# Patient Record
Sex: FEMALE | Race: BLACK OR AFRICAN AMERICAN | NOT HISPANIC OR LATINO | Employment: OTHER | URBAN - METROPOLITAN AREA
[De-identification: names, ages, dates, MRNs, and addresses within clinical notes are randomized per-mention and may not be internally consistent; named-entity substitution may affect disease eponyms.]

---

## 2021-04-21 ENCOUNTER — HOSPITAL ENCOUNTER (EMERGENCY)
Facility: HOSPITAL | Age: 51
Discharge: HOME | End: 2021-04-21
Attending: FAMILY MEDICINE
Payer: MEDICARE

## 2021-04-21 VITALS
WEIGHT: 190.06 LBS | OXYGEN SATURATION: 98 % | HEART RATE: 83 BPM | TEMPERATURE: 99 F | RESPIRATION RATE: 17 BRPM | DIASTOLIC BLOOD PRESSURE: 84 MMHG | SYSTOLIC BLOOD PRESSURE: 145 MMHG

## 2021-04-21 DIAGNOSIS — R42 DIZZINESS: ICD-10-CM

## 2021-04-21 DIAGNOSIS — R42 VERTIGO: Primary | ICD-10-CM

## 2021-04-21 LAB
ALBUMIN SERPL BCP-MCNC: 3.6 G/DL (ref 3.5–5.2)
ALP SERPL-CCNC: 75 U/L (ref 55–135)
ALT SERPL W/O P-5'-P-CCNC: 54 U/L (ref 10–44)
AMPHET+METHAMPHET UR QL: NEGATIVE
ANION GAP SERPL CALC-SCNC: 7 MMOL/L (ref 8–16)
AST SERPL-CCNC: 47 U/L (ref 10–40)
BACTERIA #/AREA URNS HPF: ABNORMAL /HPF
BARBITURATES UR QL SCN>200 NG/ML: NEGATIVE
BASOPHILS # BLD AUTO: 0.01 K/UL (ref 0–0.2)
BASOPHILS NFR BLD: 0.2 % (ref 0–1.9)
BENZODIAZ UR QL SCN>200 NG/ML: NEGATIVE
BILIRUB SERPL-MCNC: 0.3 MG/DL (ref 0.1–1)
BILIRUB UR QL STRIP: NEGATIVE
BNP SERPL-MCNC: 27 PG/ML (ref 0–99)
BUN SERPL-MCNC: 11 MG/DL (ref 6–20)
BZE UR QL SCN: NEGATIVE
CALCIUM SERPL-MCNC: 9.4 MG/DL (ref 8.7–10.5)
CANNABINOIDS UR QL SCN: NEGATIVE
CHLORIDE SERPL-SCNC: 108 MMOL/L (ref 95–110)
CLARITY UR: CLEAR
CO2 SERPL-SCNC: 25 MMOL/L (ref 23–29)
COLOR UR: YELLOW
CREAT SERPL-MCNC: 0.8 MG/DL (ref 0.5–1.4)
CREAT UR-MCNC: 26.9 MG/DL (ref 15–325)
DIFFERENTIAL METHOD: ABNORMAL
EOSINOPHIL # BLD AUTO: 0.1 K/UL (ref 0–0.5)
EOSINOPHIL NFR BLD: 2.1 % (ref 0–8)
ERYTHROCYTE [DISTWIDTH] IN BLOOD BY AUTOMATED COUNT: 12.8 % (ref 11.5–14.5)
EST. GFR  (AFRICAN AMERICAN): >60 ML/MIN/1.73 M^2
EST. GFR  (NON AFRICAN AMERICAN): >60 ML/MIN/1.73 M^2
GLUCOSE SERPL-MCNC: 92 MG/DL (ref 70–110)
GLUCOSE UR QL STRIP: NEGATIVE
HCT VFR BLD AUTO: 41 % (ref 37–48.5)
HGB BLD-MCNC: 13.9 G/DL (ref 12–16)
HGB UR QL STRIP: ABNORMAL
IMM GRANULOCYTES # BLD AUTO: 0 K/UL (ref 0–0.04)
IMM GRANULOCYTES NFR BLD AUTO: 0 % (ref 0–0.5)
KETONES UR QL STRIP: NEGATIVE
LEUKOCYTE ESTERASE UR QL STRIP: ABNORMAL
LYMPHOCYTES # BLD AUTO: 2.5 K/UL (ref 1–4.8)
LYMPHOCYTES NFR BLD: 56.2 % (ref 18–48)
MCH RBC QN AUTO: 32 PG (ref 27–31)
MCHC RBC AUTO-ENTMCNC: 33.9 G/DL (ref 32–36)
MCV RBC AUTO: 95 FL (ref 82–98)
METHADONE UR QL SCN>300 NG/ML: NEGATIVE
MICROSCOPIC COMMENT: ABNORMAL
MONOCYTES # BLD AUTO: 0.3 K/UL (ref 0.3–1)
MONOCYTES NFR BLD: 6.9 % (ref 4–15)
NEUTROPHILS # BLD AUTO: 1.5 K/UL (ref 1.8–7.7)
NEUTROPHILS NFR BLD: 34.6 % (ref 38–73)
NITRITE UR QL STRIP: NEGATIVE
NRBC BLD-RTO: 0 /100 WBC
OPIATES UR QL SCN: NEGATIVE
PCP UR QL SCN>25 NG/ML: NEGATIVE
PH UR STRIP: 6 [PH] (ref 5–8)
PLATELET # BLD AUTO: 215 K/UL (ref 150–450)
PLATELET BLD QL SMEAR: ABNORMAL
PMV BLD AUTO: 10.2 FL (ref 9.2–12.9)
POCT GLUCOSE: 98 MG/DL (ref 70–110)
POTASSIUM SERPL-SCNC: 3.9 MMOL/L (ref 3.5–5.1)
PROT SERPL-MCNC: 8.8 G/DL (ref 6–8.4)
PROT UR QL STRIP: NEGATIVE
RBC # BLD AUTO: 4.34 M/UL (ref 4–5.4)
RBC #/AREA URNS HPF: 3 /HPF (ref 0–4)
SODIUM SERPL-SCNC: 140 MMOL/L (ref 136–145)
SP GR UR STRIP: 1.01 (ref 1–1.03)
TOXICOLOGY INFORMATION: NORMAL
TROPONIN I SERPL DL<=0.01 NG/ML-MCNC: 0.01 NG/ML (ref 0–0.03)
URN SPEC COLLECT METH UR: ABNORMAL
UROBILINOGEN UR STRIP-ACNC: NEGATIVE EU/DL
WBC # BLD AUTO: 4.36 K/UL (ref 3.9–12.7)
WBC #/AREA URNS HPF: 7 /HPF (ref 0–5)

## 2021-04-21 PROCEDURE — 93005 ELECTROCARDIOGRAM TRACING: CPT

## 2021-04-21 PROCEDURE — 80053 COMPREHEN METABOLIC PANEL: CPT | Performed by: FAMILY MEDICINE

## 2021-04-21 PROCEDURE — 85025 COMPLETE CBC W/AUTO DIFF WBC: CPT | Performed by: FAMILY MEDICINE

## 2021-04-21 PROCEDURE — 80307 DRUG TEST PRSMV CHEM ANLYZR: CPT | Performed by: FAMILY MEDICINE

## 2021-04-21 PROCEDURE — 81000 URINALYSIS NONAUTO W/SCOPE: CPT | Mod: 59 | Performed by: FAMILY MEDICINE

## 2021-04-21 PROCEDURE — 99284 EMERGENCY DEPT VISIT MOD MDM: CPT | Mod: 25

## 2021-04-21 PROCEDURE — 84484 ASSAY OF TROPONIN QUANT: CPT | Performed by: FAMILY MEDICINE

## 2021-04-21 PROCEDURE — 25000003 PHARM REV CODE 250: Performed by: FAMILY MEDICINE

## 2021-04-21 PROCEDURE — 82962 GLUCOSE BLOOD TEST: CPT

## 2021-04-21 PROCEDURE — 93010 EKG 12-LEAD: ICD-10-PCS | Mod: ,,, | Performed by: INTERNAL MEDICINE

## 2021-04-21 PROCEDURE — 93010 ELECTROCARDIOGRAM REPORT: CPT | Mod: ,,, | Performed by: INTERNAL MEDICINE

## 2021-04-21 PROCEDURE — 83880 ASSAY OF NATRIURETIC PEPTIDE: CPT | Performed by: FAMILY MEDICINE

## 2021-04-21 RX ORDER — LISINOPRIL AND HYDROCHLOROTHIAZIDE 20; 25 MG/1; MG/1
1 TABLET ORAL DAILY
COMMUNITY

## 2021-04-21 RX ORDER — MECLIZINE HYDROCHLORIDE 25 MG/1
25 TABLET ORAL
Status: COMPLETED | OUTPATIENT
Start: 2021-04-21 | End: 2021-04-21

## 2021-04-21 RX ORDER — MECLIZINE HYDROCHLORIDE 25 MG/1
25 TABLET ORAL 3 TIMES DAILY PRN
Qty: 20 TABLET | Refills: 0 | Status: SHIPPED | OUTPATIENT
Start: 2021-04-21

## 2021-04-21 RX ADMIN — MECLIZINE HYDROCHLORIDE 25 MG: 25 TABLET ORAL at 07:04

## 2022-08-02 PROCEDURE — 99285 EMERGENCY DEPT VISIT HI MDM: CPT | Mod: 25

## 2022-08-03 ENCOUNTER — HOSPITAL ENCOUNTER (INPATIENT)
Facility: HOSPITAL | Age: 52
LOS: 2 days | Discharge: HOME | DRG: 824 | End: 2022-08-05
Attending: EMERGENCY MEDICINE | Admitting: INTERNAL MEDICINE
Payer: MEDICARE

## 2022-08-03 ENCOUNTER — TELEPHONE (OUTPATIENT)
Dept: PREADMISSION TESTING | Facility: HOSPITAL | Age: 52
End: 2022-08-03
Payer: MEDICARE

## 2022-08-03 DIAGNOSIS — B20 HIV DISEASE: ICD-10-CM

## 2022-08-03 DIAGNOSIS — L04.9 NECROTIZING INFLAMMATION OF LYMPH NODE: ICD-10-CM

## 2022-08-03 DIAGNOSIS — L03.221 CELLULITIS OF NECK: ICD-10-CM

## 2022-08-03 DIAGNOSIS — R22.1 MASS OF LEFT SIDE OF NECK: Primary | ICD-10-CM

## 2022-08-03 PROBLEM — I10 PRIMARY HYPERTENSION: Status: ACTIVE | Noted: 2022-08-03

## 2022-08-03 LAB
ALBUMIN SERPL BCP-MCNC: 3.5 G/DL (ref 3.5–5.2)
ALP SERPL-CCNC: 86 U/L (ref 55–135)
ALT SERPL W/O P-5'-P-CCNC: 32 U/L (ref 10–44)
ANION GAP SERPL CALC-SCNC: 9 MMOL/L (ref 8–16)
ANISOCYTOSIS BLD QL SMEAR: SLIGHT
AST SERPL-CCNC: 37 U/L (ref 10–40)
BASOPHILS # BLD AUTO: 0.01 K/UL (ref 0–0.2)
BASOPHILS NFR BLD: 0.2 % (ref 0–1.9)
BILIRUB SERPL-MCNC: 0.3 MG/DL (ref 0.1–1)
BUN SERPL-MCNC: 11 MG/DL (ref 6–20)
CALCIUM SERPL-MCNC: 9.9 MG/DL (ref 8.7–10.5)
CHLORIDE SERPL-SCNC: 104 MMOL/L (ref 95–110)
CO2 SERPL-SCNC: 24 MMOL/L (ref 23–29)
CREAT SERPL-MCNC: 0.8 MG/DL (ref 0.5–1.4)
DIFFERENTIAL METHOD: ABNORMAL
EOSINOPHIL # BLD AUTO: 0.1 K/UL (ref 0–0.5)
EOSINOPHIL NFR BLD: 1.6 % (ref 0–8)
ERYTHROCYTE [DISTWIDTH] IN BLOOD BY AUTOMATED COUNT: 13.2 % (ref 11.5–14.5)
EST. GFR  (NO RACE VARIABLE): >60 ML/MIN/1.73 M^2
GLUCOSE SERPL-MCNC: 111 MG/DL (ref 70–110)
HCT VFR BLD AUTO: 33.9 % (ref 37–48.5)
HGB BLD-MCNC: 11.6 G/DL (ref 12–16)
IMM GRANULOCYTES # BLD AUTO: 0.02 K/UL (ref 0–0.04)
IMM GRANULOCYTES NFR BLD AUTO: 0.3 % (ref 0–0.5)
LDH SERPL L TO P-CCNC: 483 U/L (ref 110–260)
LYMPHOCYTES # BLD AUTO: 2.9 K/UL (ref 1–4.8)
LYMPHOCYTES NFR BLD: 45.8 % (ref 18–48)
MCH RBC QN AUTO: 31.8 PG (ref 27–31)
MCHC RBC AUTO-ENTMCNC: 34.2 G/DL (ref 32–36)
MCV RBC AUTO: 93 FL (ref 82–98)
MONOCYTES # BLD AUTO: 0.5 K/UL (ref 0.3–1)
MONOCYTES NFR BLD: 7.2 % (ref 4–15)
NEUTROPHILS # BLD AUTO: 2.8 K/UL (ref 1.8–7.7)
NEUTROPHILS NFR BLD: 44.9 % (ref 38–73)
NRBC BLD-RTO: 0 /100 WBC
OVALOCYTES BLD QL SMEAR: ABNORMAL
PLATELET # BLD AUTO: 234 K/UL (ref 150–450)
PLATELET BLD QL SMEAR: ABNORMAL
PMV BLD AUTO: 9.4 FL (ref 9.2–12.9)
POTASSIUM SERPL-SCNC: 4.2 MMOL/L (ref 3.5–5.1)
PROT SERPL-MCNC: 8.2 G/DL (ref 6–8.4)
RBC # BLD AUTO: 3.65 M/UL (ref 4–5.4)
SARS-COV-2 RDRP RESP QL NAA+PROBE: NEGATIVE
SODIUM SERPL-SCNC: 137 MMOL/L (ref 136–145)
T3 SERPL-MCNC: 156 NG/DL (ref 60–180)
T4 FREE SERPL-MCNC: 1.08 NG/DL (ref 0.71–1.51)
TSH SERPL DL<=0.005 MIU/L-ACNC: 0.57 UIU/ML (ref 0.4–4)
WBC # BLD AUTO: 6.24 K/UL (ref 3.9–12.7)

## 2022-08-03 PROCEDURE — 99223 PR INITIAL HOSPITAL CARE,LEVL III: ICD-10-PCS | Mod: ,,, | Performed by: INTERNAL MEDICINE

## 2022-08-03 PROCEDURE — 87536 HIV-1 QUANT&REVRSE TRNSCRPJ: CPT | Performed by: INTERNAL MEDICINE

## 2022-08-03 PROCEDURE — 84439 ASSAY OF FREE THYROXINE: CPT | Performed by: NURSE PRACTITIONER

## 2022-08-03 PROCEDURE — 63600175 PHARM REV CODE 636 W HCPCS: Performed by: INTERNAL MEDICINE

## 2022-08-03 PROCEDURE — 25000003 PHARM REV CODE 250: Performed by: EMERGENCY MEDICINE

## 2022-08-03 PROCEDURE — 36415 COLL VENOUS BLD VENIPUNCTURE: CPT | Performed by: NURSE PRACTITIONER

## 2022-08-03 PROCEDURE — 84480 ASSAY TRIIODOTHYRONINE (T3): CPT | Performed by: NURSE PRACTITIONER

## 2022-08-03 PROCEDURE — 25000003 PHARM REV CODE 250: Performed by: NURSE PRACTITIONER

## 2022-08-03 PROCEDURE — 87040 BLOOD CULTURE FOR BACTERIA: CPT | Performed by: INTERNAL MEDICINE

## 2022-08-03 PROCEDURE — 99223 1ST HOSP IP/OBS HIGH 75: CPT | Mod: ,,, | Performed by: INTERNAL MEDICINE

## 2022-08-03 PROCEDURE — 80053 COMPREHEN METABOLIC PANEL: CPT | Performed by: NURSE PRACTITIONER

## 2022-08-03 PROCEDURE — 85025 COMPLETE CBC W/AUTO DIFF WBC: CPT | Performed by: NURSE PRACTITIONER

## 2022-08-03 PROCEDURE — 25000003 PHARM REV CODE 250: Performed by: INTERNAL MEDICINE

## 2022-08-03 PROCEDURE — 36415 COLL VENOUS BLD VENIPUNCTURE: CPT | Performed by: INTERNAL MEDICINE

## 2022-08-03 PROCEDURE — 83615 LACTATE (LD) (LDH) ENZYME: CPT | Performed by: NURSE PRACTITIONER

## 2022-08-03 PROCEDURE — 96365 THER/PROPH/DIAG IV INF INIT: CPT

## 2022-08-03 PROCEDURE — 96361 HYDRATE IV INFUSION ADD-ON: CPT

## 2022-08-03 PROCEDURE — 86361 T CELL ABSOLUTE COUNT: CPT | Performed by: INTERNAL MEDICINE

## 2022-08-03 PROCEDURE — 94761 N-INVAS EAR/PLS OXIMETRY MLT: CPT

## 2022-08-03 PROCEDURE — 96375 TX/PRO/DX INJ NEW DRUG ADDON: CPT

## 2022-08-03 PROCEDURE — 21400001 HC TELEMETRY ROOM

## 2022-08-03 PROCEDURE — U0002 COVID-19 LAB TEST NON-CDC: HCPCS | Performed by: EMERGENCY MEDICINE

## 2022-08-03 PROCEDURE — 25500020 PHARM REV CODE 255: Performed by: EMERGENCY MEDICINE

## 2022-08-03 PROCEDURE — 63600175 PHARM REV CODE 636 W HCPCS: Performed by: EMERGENCY MEDICINE

## 2022-08-03 PROCEDURE — 84443 ASSAY THYROID STIM HORMONE: CPT | Performed by: NURSE PRACTITIONER

## 2022-08-03 RX ORDER — HYDROCODONE BITARTRATE AND ACETAMINOPHEN 5; 325 MG/1; MG/1
1 TABLET ORAL EVERY 6 HOURS PRN
Status: DISCONTINUED | OUTPATIENT
Start: 2022-08-03 | End: 2022-08-05 | Stop reason: HOSPADM

## 2022-08-03 RX ORDER — ACETAMINOPHEN 325 MG/1
650 TABLET ORAL EVERY 6 HOURS PRN
Status: DISCONTINUED | OUTPATIENT
Start: 2022-08-03 | End: 2022-08-05 | Stop reason: HOSPADM

## 2022-08-03 RX ORDER — BISACODYL 10 MG
10 SUPPOSITORY, RECTAL RECTAL DAILY PRN
Status: DISCONTINUED | OUTPATIENT
Start: 2022-08-03 | End: 2022-08-05 | Stop reason: HOSPADM

## 2022-08-03 RX ORDER — ONDANSETRON 2 MG/ML
4 INJECTION INTRAMUSCULAR; INTRAVENOUS EVERY 8 HOURS PRN
Status: DISCONTINUED | OUTPATIENT
Start: 2022-08-03 | End: 2022-08-05 | Stop reason: HOSPADM

## 2022-08-03 RX ORDER — SODIUM CHLORIDE 0.9 % (FLUSH) 0.9 %
10 SYRINGE (ML) INJECTION EVERY 12 HOURS PRN
Status: DISCONTINUED | OUTPATIENT
Start: 2022-08-03 | End: 2022-08-05 | Stop reason: HOSPADM

## 2022-08-03 RX ORDER — LISINOPRIL 20 MG/1
20 TABLET ORAL DAILY
Status: DISCONTINUED | OUTPATIENT
Start: 2022-08-03 | End: 2022-08-05 | Stop reason: HOSPADM

## 2022-08-03 RX ORDER — MECLIZINE HYDROCHLORIDE 25 MG/1
25 TABLET ORAL 3 TIMES DAILY PRN
Status: DISCONTINUED | OUTPATIENT
Start: 2022-08-03 | End: 2022-08-05 | Stop reason: HOSPADM

## 2022-08-03 RX ORDER — HYDROCODONE BITARTRATE AND ACETAMINOPHEN 10; 325 MG/1; MG/1
1 TABLET ORAL EVERY 6 HOURS PRN
Status: DISCONTINUED | OUTPATIENT
Start: 2022-08-03 | End: 2022-08-05 | Stop reason: HOSPADM

## 2022-08-03 RX ORDER — ENOXAPARIN SODIUM 100 MG/ML
40 INJECTION SUBCUTANEOUS EVERY 24 HOURS
Status: DISCONTINUED | OUTPATIENT
Start: 2022-08-03 | End: 2022-08-03

## 2022-08-03 RX ORDER — NALOXONE HCL 0.4 MG/ML
0.02 VIAL (ML) INJECTION
Status: DISCONTINUED | OUTPATIENT
Start: 2022-08-03 | End: 2022-08-05 | Stop reason: HOSPADM

## 2022-08-03 RX ORDER — KETOROLAC TROMETHAMINE 30 MG/ML
15 INJECTION, SOLUTION INTRAMUSCULAR; INTRAVENOUS
Status: COMPLETED | OUTPATIENT
Start: 2022-08-03 | End: 2022-08-03

## 2022-08-03 RX ORDER — HYDROCODONE BITARTRATE AND ACETAMINOPHEN 5; 325 MG/1; MG/1
1 TABLET ORAL EVERY 4 HOURS PRN
Status: DISCONTINUED | OUTPATIENT
Start: 2022-08-03 | End: 2022-08-03

## 2022-08-03 RX ORDER — LISINOPRIL AND HYDROCHLOROTHIAZIDE 20; 25 MG/1; MG/1
1 TABLET ORAL DAILY
Status: DISCONTINUED | OUTPATIENT
Start: 2022-08-03 | End: 2022-08-03 | Stop reason: CLARIF

## 2022-08-03 RX ORDER — TALC
6 POWDER (GRAM) TOPICAL NIGHTLY PRN
Status: DISCONTINUED | OUTPATIENT
Start: 2022-08-03 | End: 2022-08-03

## 2022-08-03 RX ORDER — AMOXICILLIN 250 MG
1 CAPSULE ORAL DAILY PRN
Status: DISCONTINUED | OUTPATIENT
Start: 2022-08-03 | End: 2022-08-05 | Stop reason: HOSPADM

## 2022-08-03 RX ORDER — HYDROCHLOROTHIAZIDE 25 MG/1
25 TABLET ORAL DAILY
Status: DISCONTINUED | OUTPATIENT
Start: 2022-08-03 | End: 2022-08-05 | Stop reason: HOSPADM

## 2022-08-03 RX ORDER — ACETAMINOPHEN 325 MG/1
650 TABLET ORAL EVERY 4 HOURS PRN
Status: DISCONTINUED | OUTPATIENT
Start: 2022-08-03 | End: 2022-08-03

## 2022-08-03 RX ORDER — TALC
6 POWDER (GRAM) TOPICAL NIGHTLY PRN
Status: DISCONTINUED | OUTPATIENT
Start: 2022-08-03 | End: 2022-08-05 | Stop reason: HOSPADM

## 2022-08-03 RX ORDER — DOXYCYCLINE HYCLATE 100 MG
100 TABLET ORAL EVERY 12 HOURS
Status: DISCONTINUED | OUTPATIENT
Start: 2022-08-03 | End: 2022-08-05

## 2022-08-03 RX ORDER — MAG HYDROX/ALUMINUM HYD/SIMETH 200-200-20
30 SUSPENSION, ORAL (FINAL DOSE FORM) ORAL 4 TIMES DAILY PRN
Status: DISCONTINUED | OUTPATIENT
Start: 2022-08-03 | End: 2022-08-05 | Stop reason: HOSPADM

## 2022-08-03 RX ORDER — SODIUM CHLORIDE 9 MG/ML
INJECTION, SOLUTION INTRAVENOUS CONTINUOUS
Status: ACTIVE | OUTPATIENT
Start: 2022-08-03 | End: 2022-08-04

## 2022-08-03 RX ORDER — SODIUM CHLORIDE 0.9 % (FLUSH) 0.9 %
10 SYRINGE (ML) INJECTION
Status: DISCONTINUED | OUTPATIENT
Start: 2022-08-03 | End: 2022-08-05 | Stop reason: HOSPADM

## 2022-08-03 RX ORDER — ONDANSETRON 2 MG/ML
4 INJECTION INTRAMUSCULAR; INTRAVENOUS EVERY 8 HOURS PRN
Status: DISCONTINUED | OUTPATIENT
Start: 2022-08-03 | End: 2022-08-03

## 2022-08-03 RX ORDER — PROMETHAZINE HYDROCHLORIDE 25 MG/1
25 TABLET ORAL EVERY 6 HOURS PRN
Status: DISCONTINUED | OUTPATIENT
Start: 2022-08-03 | End: 2022-08-05 | Stop reason: HOSPADM

## 2022-08-03 RX ADMIN — IOHEXOL 100 ML: 350 INJECTION, SOLUTION INTRAVENOUS at 02:08

## 2022-08-03 RX ADMIN — CEFTRIAXONE 1 G: 1 INJECTION, SOLUTION INTRAVENOUS at 11:08

## 2022-08-03 RX ADMIN — SODIUM CHLORIDE: 0.9 INJECTION, SOLUTION INTRAVENOUS at 07:08

## 2022-08-03 RX ADMIN — LISINOPRIL 20 MG: 20 TABLET ORAL at 09:08

## 2022-08-03 RX ADMIN — KETOROLAC TROMETHAMINE 15 MG: 30 INJECTION, SOLUTION INTRAMUSCULAR; INTRAVENOUS at 02:08

## 2022-08-03 RX ADMIN — SODIUM CHLORIDE: 0.9 INJECTION, SOLUTION INTRAVENOUS at 03:08

## 2022-08-03 RX ADMIN — HYDROCODONE BITARTRATE AND ACETAMINOPHEN 1 TABLET: 10; 325 TABLET ORAL at 09:08

## 2022-08-03 RX ADMIN — ELVITEGRAVIR, COBICISTAT, EMTRICITABINE, AND TENOFOVIR ALAFENAMIDE 1 TABLET: 150; 150; 200; 10 TABLET ORAL at 03:08

## 2022-08-03 RX ADMIN — HYDROCHLOROTHIAZIDE 25 MG: 25 TABLET ORAL at 09:08

## 2022-08-03 RX ADMIN — DOXYCYCLINE HYCLATE 100 MG: 100 TABLET, COATED ORAL at 09:08

## 2022-08-03 RX ADMIN — DOXYCYCLINE HYCLATE 100 MG: 100 TABLET, COATED ORAL at 11:08

## 2022-08-03 RX ADMIN — SODIUM CHLORIDE 1000 ML: 0.9 INJECTION, SOLUTION INTRAVENOUS at 12:08

## 2022-08-03 NOTE — ED NOTES
Patient identifiers verified and correct for Ama Sol.    Left neck swelling and pain     LOC: The patient is awake, alert and aware of environment with an appropriate affect, the patient is oriented x 3 and speaking appropriately.    APPEARANCE: Patient resting comfortably and in no acute distress, patient is clean and well groomed, patient's clothing is properly fastened.    SKIN: The skin is warm and dry, color consistent with ethnicity, patient has normal skin turgor and moist mucus membranes, skin intact, no breakdown or bruising noted.     MUSCULOSKELETAL: Patient moving all extremities spontaneously.    RESPIRATORY: Airway is open and patent, respirations are spontaneous.    CARDIAC: Patient has a normal rate, no periphreal edema noted, capillary refill < 3 seconds.    ABDOMEN: Soft and non tender to palpation.

## 2022-08-03 NOTE — PLAN OF CARE
Received to room 217 from ER. Swelling to left neck. Denies c/o pain at this time. Regular diet, NPO after midnight for procedure tomorrow. Oriented to room,. Call bell in reach. No s/s acute distress. Will monitor.

## 2022-08-03 NOTE — TELEPHONE ENCOUNTER
----- Message from Cristina Neri LPN sent at 8/3/2022  1:37 PM CDT -----  Regarding: RE: surgery tomorrow  This patient is going to be admitted today. No need for pre op to contact. Dr Garcia is aware.   ----- Message -----  From: Shweta Granda RN  Sent: 8/3/2022   1:21 PM CDT  To: [facility]  Subject: surgery tomorrow                                 Good afternoon, will this surgery patient for tomorrow be a Hospital Admit or discharged today? Our department does not call them if they are admitted. Thanks.    -Pre Admit Testing dept.

## 2022-08-03 NOTE — ED PROVIDER NOTES
SCRIBE #1 NOTE: I, Mino Ho, am scribing for, and in the presence of, Dar Portillo MD. I have scribed the entire note.       History     Chief Complaint   Patient presents with    Neck Pain     Increased left sided neck pain and swelling x2 weeks.pt reports sore throat     Review of patient's allergies indicates:  No Known Allergies      History of Present Illness     HPI    8/3/2022, 3:22 AM  History obtained from the patient      History of Present Illness: Ama Sol is a 52 y.o. female patient who presents to the Emergency Department for evaluation of L neck pain which onset gradually 3 weeks ago. Pt states she had a bulge in her L ear that she scratched and resolved. She reports that's when she began to have her L neck pain along with bumps in her neck and shoulder area. Symptoms are constant and moderate in severity. No mitigating or exacerbating factors reported. Associated sxs include diaphoresis and subjective fever. Pt notes she is unsure if the diaphoresis is related to her menopause. Patient denies any HA, weakness, CP, n/v/d, chills, fatigue, SOB, abdominal pain, and all other sxs at this time. No prior Tx reported. No further complaints or concerns at this time.       Arrival mode: Personal vehicle    PCP: Meka Andrews        Past Medical History:  History reviewed. No pertinent past medical history.    Past Surgical History:  History reviewed. No pertinent surgical history.      Family History:  History reviewed. No pertinent family history.    Social History:  Social History     Tobacco Use    Smoking status: Current Every Day Smoker     Packs/day: 0.50     Types: Cigarettes    Smokeless tobacco: Never Used   Substance and Sexual Activity    Alcohol use: Not Currently    Drug use: Not Currently    Sexual activity: Not on file        Review of Systems     Review of Systems   Constitutional: Positive for diaphoresis and fever (Subjective).   HENT: Negative for sore  throat.    Respiratory: Negative for shortness of breath.    Cardiovascular: Negative for chest pain.   Gastrointestinal: Negative for nausea.   Genitourinary: Negative for dysuria.   Musculoskeletal: Positive for neck pain (L). Negative for back pain.   Skin: Negative for rash.   Neurological: Negative for weakness.   Hematological: Does not bruise/bleed easily.   All other systems reviewed and are negative.       Physical Exam     Initial Vitals [08/02/22 2157]   BP Pulse Resp Temp SpO2   (!) 188/105 (!) 134 18 99.8 °F (37.7 °C) 100 %      MAP       --          Physical Exam   Nursing Notes and Vital Signs Reviewed.  Constitutional: Patient is in no acute distress. Well-developed and well-nourished.  Head: Atraumatic. Normocephalic.  Eyes: PERRL. EOM intact. Conjunctivae are not pale. No scleral icterus.  ENT: Mucous membranes are moist.   Neck: Supple. Full ROM. Supraclavicular and left anterior cervical chain. Tender and significantly enlarged lymphnodes with overlying erythema and bulging.  Cardiovascular: Regular rate. Regular rhythm. No murmurs, rubs, or gallops. Distal pulses are 2+ and symmetric.  Pulmonary/Chest: No respiratory distress. Clear to auscultation bilaterally. No wheezing or rales.  Abdominal: Soft and non-distended.  There is no tenderness.  No rebound, guarding, or rigidity. Good bowel sounds.  Musculoskeletal: Moves all extremities. No obvious deformities. No edema.  Skin: Warm and dry.  Neurological:  Alert, awake, and appropriate.  Normal speech.  No acute focal neurological deficits are appreciated.  Psychiatric: Normal affect. Good eye contact. Appropriate in content.     ED Course   Procedures  ED Vital Signs:  Vitals:    08/04/22 1500 08/04/22 1505 08/04/22 1515 08/04/22 1530   BP: 121/84 (!) 142/79 (!) 147/76 (!) 146/73   Pulse: (!) 123 (!) 121 97 (!) 114   Resp: (!) 21 18 18 14   Temp:       TempSrc:       SpO2: 96% 97% 96% (!) 90%   Weight:       Height:        08/04/22 1545  08/04/22 1550 08/04/22 1600 08/04/22 1621   BP: (!) 150/74  113/68 133/65   Pulse: 96   101   Resp: 16 14  16   Temp:   99.7 °F (37.6 °C) 96.9 °F (36.1 °C)   TempSrc:   Temporal Oral   SpO2: 100%   100%   Weight:       Height:        08/04/22 2027 08/04/22 2029 08/04/22 2357 08/05/22 0407   BP:  99/71 113/77 (!) 159/90   Pulse:  94 98 109   Resp: 16 18 20 20   Temp:  99 °F (37.2 °C) 98.6 °F (37 °C) 98.6 °F (37 °C)   TempSrc:  Oral Oral Oral   SpO2:  98% 97% 98%   Weight:       Height:        08/05/22 0640 08/05/22 0758 08/05/22 0842   BP:  134/80    Pulse:  (!) 119    Resp: 18 17 16   Temp:  99.8 °F (37.7 °C)    TempSrc:  Oral    SpO2:  98%    Weight:      Height:          Abnormal Lab Results:  Labs Reviewed   CBC W/ AUTO DIFFERENTIAL - Abnormal; Notable for the following components:       Result Value    RBC 3.65 (*)     Hemoglobin 11.6 (*)     Hematocrit 33.9 (*)     MCH 31.8 (*)     All other components within normal limits   COMPREHENSIVE METABOLIC PANEL - Abnormal; Notable for the following components:    Glucose 111 (*)     All other components within normal limits   LACTATE DEHYDROGENASE - Abnormal; Notable for the following components:     (*)     All other components within normal limits   TSH   T3   T4, FREE   LACTATE DEHYDROGENASE   SARS-COV-2 RNA AMPLIFICATION, QUAL        All Lab Results:  Results for orders placed or performed during the hospital encounter of 08/03/22   Blood culture    Specimen: Peripheral, Antecubital, Left; Blood   Result Value Ref Range    Blood Culture, Routine No Growth to date     Blood Culture, Routine No Growth to date    Blood culture    Specimen: Peripheral, Antecubital, Left; Blood   Result Value Ref Range    Blood Culture, Routine No Growth to date     Blood Culture, Routine No Growth to date    CBC auto differential   Result Value Ref Range    WBC 6.24 3.90 - 12.70 K/uL    RBC 3.65 (L) 4.00 - 5.40 M/uL    Hemoglobin 11.6 (L) 12.0 - 16.0 g/dL    Hematocrit 33.9 (L)  37.0 - 48.5 %    MCV 93 82 - 98 fL    MCH 31.8 (H) 27.0 - 31.0 pg    MCHC 34.2 32.0 - 36.0 g/dL    RDW 13.2 11.5 - 14.5 %    Platelets 234 150 - 450 K/uL    MPV 9.4 9.2 - 12.9 fL    Immature Granulocytes 0.3 0.0 - 0.5 %    Gran # (ANC) 2.8 1.8 - 7.7 K/uL    Immature Grans (Abs) 0.02 0.00 - 0.04 K/uL    Lymph # 2.9 1.0 - 4.8 K/uL    Mono # 0.5 0.3 - 1.0 K/uL    Eos # 0.1 0.0 - 0.5 K/uL    Baso # 0.01 0.00 - 0.20 K/uL    nRBC 0 0 /100 WBC    Gran % 44.9 38.0 - 73.0 %    Lymph % 45.8 18.0 - 48.0 %    Mono % 7.2 4.0 - 15.0 %    Eosinophil % 1.6 0.0 - 8.0 %    Basophil % 0.2 0.0 - 1.9 %    Platelet Estimate Appears normal     Aniso Slight     Ovalocytes Occasional     Differential Method Automated    Comprehensive metabolic panel   Result Value Ref Range    Sodium 137 136 - 145 mmol/L    Potassium 4.2 3.5 - 5.1 mmol/L    Chloride 104 95 - 110 mmol/L    CO2 24 23 - 29 mmol/L    Glucose 111 (H) 70 - 110 mg/dL    BUN 11 6 - 20 mg/dL    Creatinine 0.8 0.5 - 1.4 mg/dL    Calcium 9.9 8.7 - 10.5 mg/dL    Total Protein 8.2 6.0 - 8.4 g/dL    Albumin 3.5 3.5 - 5.2 g/dL    Total Bilirubin 0.3 0.1 - 1.0 mg/dL    Alkaline Phosphatase 86 55 - 135 U/L    AST 37 10 - 40 U/L    ALT 32 10 - 44 U/L    Anion Gap 9 8 - 16 mmol/L    eGFR >60 >60 mL/min/1.73 m^2   TSH   Result Value Ref Range    TSH 0.574 0.400 - 4.000 uIU/mL   T3   Result Value Ref Range    T3, Total 156 60 - 180 ng/dL   T4, Free   Result Value Ref Range    Free T4 1.08 0.71 - 1.51 ng/dL   Lactate Dehydrogenase   Result Value Ref Range     (H) 110 - 260 U/L   COVID-19 Rapid Screening   Result Value Ref Range    SARS-CoV-2 RNA, Amplification, Qual Negative Negative   CD4 T-Tibbie Cells   Result Value Ref Range    CD4 % Tibbie T Cell 26.9 (L) 28 - 57 %    Absolute CD4 661 300 - 1400 cells/ul   CBC Auto Differential   Result Value Ref Range    WBC 5.10 3.90 - 12.70 K/uL    RBC 3.29 (L) 4.00 - 5.40 M/uL    Hemoglobin 10.3 (L) 12.0 - 16.0 g/dL    Hematocrit 30.8 (L) 37.0 -  48.5 %    MCV 94 82 - 98 fL    MCH 31.3 (H) 27.0 - 31.0 pg    MCHC 33.4 32.0 - 36.0 g/dL    RDW 13.3 11.5 - 14.5 %    Platelets 208 150 - 450 K/uL    MPV 9.3 9.2 - 12.9 fL    Immature Granulocytes 0.2 0.0 - 0.5 %    Gran # (ANC) 2.5 1.8 - 7.7 K/uL    Immature Grans (Abs) 0.01 0.00 - 0.04 K/uL    Lymph # 2.1 1.0 - 4.8 K/uL    Mono # 0.4 0.3 - 1.0 K/uL    Eos # 0.1 0.0 - 0.5 K/uL    Baso # 0.02 0.00 - 0.20 K/uL    nRBC 0 0 /100 WBC    Gran % 48.4 38.0 - 73.0 %    Lymph % 40.8 18.0 - 48.0 %    Mono % 7.8 4.0 - 15.0 %    Eosinophil % 2.4 0.0 - 8.0 %    Basophil % 0.4 0.0 - 1.9 %    Differential Method Automated    Comprehensive Metabolic Panel   Result Value Ref Range    Sodium 138 136 - 145 mmol/L    Potassium 4.5 3.5 - 5.1 mmol/L    Chloride 107 95 - 110 mmol/L    CO2 22 (L) 23 - 29 mmol/L    Glucose 95 70 - 110 mg/dL    BUN 12 6 - 20 mg/dL    Creatinine 0.7 0.5 - 1.4 mg/dL    Calcium 8.5 (L) 8.7 - 10.5 mg/dL    Total Protein 6.6 6.0 - 8.4 g/dL    Albumin 2.8 (L) 3.5 - 5.2 g/dL    Total Bilirubin 0.3 0.1 - 1.0 mg/dL    Alkaline Phosphatase 77 55 - 135 U/L    AST 27 10 - 40 U/L    ALT 21 10 - 44 U/L    Anion Gap 9 8 - 16 mmol/L    eGFR >60 >60 mL/min/1.73 m^2   Procalcitonin   Result Value Ref Range    Procalcitonin 0.04 <0.25 ng/mL   CBC Auto Differential   Result Value Ref Range    WBC 5.73 3.90 - 12.70 K/uL    RBC 3.30 (L) 4.00 - 5.40 M/uL    Hemoglobin 10.3 (L) 12.0 - 16.0 g/dL    Hematocrit 31.4 (L) 37.0 - 48.5 %    MCV 95 82 - 98 fL    MCH 31.2 (H) 27.0 - 31.0 pg    MCHC 32.8 32.0 - 36.0 g/dL    RDW 13.0 11.5 - 14.5 %    Platelets 207 150 - 450 K/uL    MPV 9.7 9.2 - 12.9 fL    Immature Granulocytes 0.3 0.0 - 0.5 %    Gran # (ANC) 3.0 1.8 - 7.7 K/uL    Immature Grans (Abs) 0.02 0.00 - 0.04 K/uL    Lymph # 2.1 1.0 - 4.8 K/uL    Mono # 0.5 0.3 - 1.0 K/uL    Eos # 0.1 0.0 - 0.5 K/uL    Baso # 0.02 0.00 - 0.20 K/uL    nRBC 0 0 /100 WBC    Gran % 52.7 38.0 - 73.0 %    Lymph % 36.6 18.0 - 48.0 %    Mono % 8.4 4.0 - 15.0  %    Eosinophil % 1.7 0.0 - 8.0 %    Basophil % 0.3 0.0 - 1.9 %    Differential Method Automated    Comprehensive Metabolic Panel   Result Value Ref Range    Sodium 137 136 - 145 mmol/L    Potassium 4.2 3.5 - 5.1 mmol/L    Chloride 105 95 - 110 mmol/L    CO2 23 23 - 29 mmol/L    Glucose 97 70 - 110 mg/dL    BUN 11 6 - 20 mg/dL    Creatinine 0.8 0.5 - 1.4 mg/dL    Calcium 8.6 (L) 8.7 - 10.5 mg/dL    Total Protein 6.7 6.0 - 8.4 g/dL    Albumin 2.7 (L) 3.5 - 5.2 g/dL    Total Bilirubin 0.3 0.1 - 1.0 mg/dL    Alkaline Phosphatase 77 55 - 135 U/L    AST 29 10 - 40 U/L    ALT 24 10 - 44 U/L    Anion Gap 9 8 - 16 mmol/L    eGFR >60 >60 mL/min/1.73 m^2   Magnesium   Result Value Ref Range    Magnesium 2.0 1.6 - 2.6 mg/dL   Phosphorus   Result Value Ref Range    Phosphorus 2.7 2.7 - 4.5 mg/dL         Imaging Results:  Imaging Results           CT Soft Tissue Neck With Contrast (Final result)  Result time 08/03/22 08:10:51    Final result by Abraham Correia MD (08/03/22 08:10:51)                 Impression:      Multifocal lymphadenopathy asymmetric towards the left most focal in the left supraclavicular and lower cervical regions favoring neoplastic disease with infectious etiology or superimposed infection not excluded.  Multiple necrotic lymph nodes with local inflammatory stranding and associated skin thickening and soft tissue fat stranding are noted in the supraclavicular region.    This report was flagged in Epic as abnormal.    The preliminary and final reports are concordant.      Electronically signed by: Abraham Correia  Date:    08/03/2022  Time:    08:10             Narrative:    EXAMINATION:  CT SOFT TISSUE NECK WITH CONTRAST    CLINICAL HISTORY:  Neck mass, nonpulsatile;    TECHNIQUE:  Axial CT images obtained throughout the region of the neck after the administration of intravenous contrast. Axial, sagittal and coronal reconstructions were performed.  Images acquired after the administration 100 mL Omnipaque  350 IV contrast.    COMPARISON:  No priors.    FINDINGS:  Left-sided predominant multifocal enlarged lymph nodes the largest non necrotic lymph nodes measure 2.0 cm in short axis as seen on series 2, image 76 and in the supraclavicular region 2.4 cm in short axis as seen on series 2, image 116.    Additionally there is bilateral axillary lymphadenopathy.    In the left lower cervical and supraclavicular region if there are numerous necrotic appearing lymph nodes with significant local inflammatory stranding possibly related to recent necrosis, although superinfection is difficult to exclude.  There is also adjacent skin thickening and soft tissue swelling.  This likely relates to the ongoing process within the lymph nodes and less likely superimposed trauma.    Otherwise the soft tissues of the nasopharynx, oropharynx, hypopharynx and larynx are within normal limits. The parotid, submandibular, and thyroid glands demonstrate nothing unusual.    Mild carotid bifurcation atherosclerosis bilaterally without significant stenosis.  The left internal jugular vein is significantly compressed by the lymphadenopathy but appears patent throughout its course, although mass effect deviates the course.    Limited intracranial evaluation is within normal limits.  The visualized paranasal sinuses and mastoid air cells are essentially clear.    Lung apices are clear.  There are minor osseous degenerative changes, but no lytic or blastic lesions are evident.                               RADIOLOGY REPORT (Final result)  Result time 08/03/22 17:36:06    Final result by Unknown User (08/03/22 17:36:06)                                 3:23 AM: Per Argenis Ansari MD from [facility], pt's CT Neck with IV Contrast results: Cluster of necrotic lymph nodes are seen in the left supraclavicular area measuring about 7-8 cm in greatest dimension. Multiple enlarged left-sided lymph nodes are also seen. Correlate with infectious process or  metastatic disease.      The Emergency Provider reviewed the vital signs and test results, which are outlined above.     ED Discussion     3:57 AM: Discussed pt's case with Dr. Van (Hem/Onc) who recommends admitting the patient for rapid lymphoma workup that includes biopsy in case of aggressive lymphoma.    5:44 AM: Discussed case with Ivelisse Grant NP ([facility]). Dr. Smith agrees with current care and management of pt and accepts admission.   Admitting Service: Hospital Medicine  Admitting Physician: Dr. Smith  Admit to: obs med surg    5:44 AM: Re-evaluated pt. I have discussed test results, shared treatment plan, and the need for admission with patient and family at bedside. Pt and family express understanding at this time and agree with all information. All questions answered. Pt and family have no further questions or concerns at this time. Pt is ready for admit.         Medical Decision Making:   Clinical Tests:   Lab Tests: Ordered and Reviewed  Radiological Study: Ordered and Reviewed           ED Medication(s):  Medications   meclizine tablet 25 mg (has no administration in time range)   sodium chloride 0.9% flush 10 mL (has no administration in time range)   naloxone 0.4 mg/mL injection 0.02 mg (has no administration in time range)   0.9%  NaCl infusion ( Intravenous Verify Only 8/4/22 1722)   acetaminophen tablet 650 mg (has no administration in time range)   HYDROcodone-acetaminophen 5-325 mg per tablet 1 tablet (1 tablet Oral Given 8/5/22 0640)   HYDROcodone-acetaminophen  mg per tablet 1 tablet (1 tablet Oral Given 8/5/22 0842)   bisacodyL suppository 10 mg (has no administration in time range)   promethazine tablet 25 mg (has no administration in time range)   aluminum-magnesium hydroxide-simethicone 200-200-20 mg/5 mL suspension 30 mL (has no administration in time range)   sodium chloride 0.9% flush 10 mL (has no administration in time range)   senna-docusate 8.6-50 mg per  tablet 1 tablet (has no administration in time range)   ondansetron injection 4 mg (has no administration in time range)   melatonin tablet 6 mg (has no administration in time range)   lisinopriL tablet 20 mg (20 mg Oral Given 8/5/22 0842)     And   hydroCHLOROthiazide tablet 25 mg (25 mg Oral Given 8/5/22 0842)   elviteg-cob-emtri-tenof ALAFEN [id number] mg Tab 1 tablet (1 tablet Oral Given 8/5/22 0842)   sodium chloride 0.9% flush 10 mL (has no administration in time range)   sodium chloride 0.9% flush 10 mL (has no administration in time range)   sodium chloride 0.9% bolus 1,000 mL (0 mLs Intravenous Stopped 8/3/22 0152)   iohexoL (OMNIPAQUE 350) injection 100 mL (100 mLs Intravenous Given 8/3/22 0222)   ketorolac injection 15 mg (15 mg Intravenous Given 8/3/22 0241)       Current Discharge Medication List                  Scribe Attestation:   Scribe #1: I performed the above scribed service and the documentation accurately describes the services I performed. I attest to the accuracy of the note.     Attending:   Physician Attestation Statement for Scribe #1: I, Dar Portillo MD, personally performed the services described in this documentation, as scribed by Mino Ho, in my presence, and it is both accurate and complete.           Clinical Impression       ICD-10-CM ICD-9-CM   1. Necrotizing inflammation of lymph node  L04.9 289.3   2. HIV disease  B20 042   3. Mass of left side of neck  R22.1 784.2       Disposition:   Disposition: Placed in Observation  Condition: Serious         Dar Portillo MD  08/05/22 0117

## 2022-08-03 NOTE — SUBJECTIVE & OBJECTIVE
No past medical history on file.    No past surgical history on file.    Review of patient's allergies indicates:  No Known Allergies    No current facility-administered medications on file prior to encounter.     Current Outpatient Medications on File Prior to Encounter   Medication Sig    lisinopriL-hydrochlorothiazide (PRINZIDE,ZESTORETIC) 20-25 mg Tab Take 1 tablet by mouth once daily.    meclizine (ANTIVERT) 25 mg tablet Take 1 tablet (25 mg total) by mouth 3 (three) times daily as needed.     Family History    None       Tobacco Use    Smoking status: Current Every Day Smoker     Packs/day: 0.50     Types: Cigarettes    Smokeless tobacco: Never Used   Substance and Sexual Activity    Alcohol use: Not Currently    Drug use: Not Currently    Sexual activity: Not on file     Review of Systems   Constitutional:  Negative for activity change, appetite change and fever.   HENT:  Negative for sore throat.    Eyes:  Negative for visual disturbance.   Respiratory:  Positive for shortness of breath (mild on exertion). Negative for cough and chest tightness.    Cardiovascular:  Negative for chest pain, palpitations and leg swelling.   Gastrointestinal:  Negative for abdominal distention, abdominal pain, constipation, diarrhea, nausea and vomiting.   Endocrine: Negative for polyuria.   Genitourinary:  Negative for decreased urine volume, dysuria, flank pain, frequency and hematuria.   Musculoskeletal:  Positive for neck pain. Negative for back pain and gait problem.        Neck swelling   Skin:  Negative for rash.   Neurological:  Negative for syncope, speech difficulty, weakness, light-headedness and headaches.   Psychiatric/Behavioral:  Negative for confusion, hallucinations and sleep disturbance.    Objective:     Vital Signs (Most Recent):  Temp: 97.8 °F (36.6 °C) (08/03/22 0933)  Pulse: 93 (08/03/22 0933)  Resp: 16 (08/03/22 0933)  BP: 129/76 (08/03/22 0927)  SpO2: 100 % (08/03/22 0632) Vital Signs (24h  Range):  Temp:  [97.8 °F (36.6 °C)-99.8 °F (37.7 °C)] 97.8 °F (36.6 °C)  Pulse:  [] 93  Resp:  [16-18] 16  SpO2:  [100 %] 100 %  BP: (129-188)/() 129/76     Weight: 84.1 kg (185 lb 8.3 oz)  Body mass index is 30.87 kg/m².    Physical Exam  Constitutional:       General: She is not in acute distress.     Appearance: She is well-developed. She is not diaphoretic.   HENT:      Head: Normocephalic and atraumatic.      Mouth/Throat:      Pharynx: No oropharyngeal exudate.   Eyes:      Conjunctiva/sclera: Conjunctivae normal.      Pupils: Pupils are equal, round, and reactive to light.   Neck:      Thyroid: No thyromegaly.      Vascular: No JVD.      Comments: (+) thyromegaly   (+) large swelling noted left lat neck extending to left supraclavicular area . Mild tenderness to palpation , Small area of erythema noted on the swelling near the base of the neck   Cardiovascular:      Rate and Rhythm: Normal rate and regular rhythm.      Heart sounds: Normal heart sounds. No murmur heard.  Pulmonary:      Effort: Pulmonary effort is normal. No respiratory distress.      Breath sounds: Normal breath sounds. No wheezing or rales.   Chest:      Chest wall: No tenderness.   Abdominal:      General: Bowel sounds are normal. There is no distension.      Palpations: Abdomen is soft.      Tenderness: There is no abdominal tenderness. There is no guarding or rebound.   Musculoskeletal:         General: Normal range of motion.      Cervical back: Normal range of motion and neck supple.   Lymphadenopathy:      Cervical: No cervical adenopathy.   Skin:     General: Skin is warm and dry.      Findings: No rash.   Neurological:      Mental Status: She is alert and oriented to person, place, and time.      Cranial Nerves: No cranial nerve deficit.      Sensory: No sensory deficit.      Deep Tendon Reflexes: Reflexes normal.         CRANIAL NERVES     CN III, IV, VI   Pupils are equal, round, and reactive to light.     Significant  Labs:   Results for orders placed or performed during the hospital encounter of 08/03/22   CBC auto differential   Result Value Ref Range    WBC 6.24 3.90 - 12.70 K/uL    RBC 3.65 (L) 4.00 - 5.40 M/uL    Hemoglobin 11.6 (L) 12.0 - 16.0 g/dL    Hematocrit 33.9 (L) 37.0 - 48.5 %    MCV 93 82 - 98 fL    MCH 31.8 (H) 27.0 - 31.0 pg    MCHC 34.2 32.0 - 36.0 g/dL    RDW 13.2 11.5 - 14.5 %    Platelets 234 150 - 450 K/uL    MPV 9.4 9.2 - 12.9 fL    Immature Granulocytes 0.3 0.0 - 0.5 %    Gran # (ANC) 2.8 1.8 - 7.7 K/uL    Immature Grans (Abs) 0.02 0.00 - 0.04 K/uL    Lymph # 2.9 1.0 - 4.8 K/uL    Mono # 0.5 0.3 - 1.0 K/uL    Eos # 0.1 0.0 - 0.5 K/uL    Baso # 0.01 0.00 - 0.20 K/uL    nRBC 0 0 /100 WBC    Gran % 44.9 38.0 - 73.0 %    Lymph % 45.8 18.0 - 48.0 %    Mono % 7.2 4.0 - 15.0 %    Eosinophil % 1.6 0.0 - 8.0 %    Basophil % 0.2 0.0 - 1.9 %    Platelet Estimate Appears normal     Aniso Slight     Ovalocytes Occasional     Differential Method Automated    Comprehensive metabolic panel   Result Value Ref Range    Sodium 137 136 - 145 mmol/L    Potassium 4.2 3.5 - 5.1 mmol/L    Chloride 104 95 - 110 mmol/L    CO2 24 23 - 29 mmol/L    Glucose 111 (H) 70 - 110 mg/dL    BUN 11 6 - 20 mg/dL    Creatinine 0.8 0.5 - 1.4 mg/dL    Calcium 9.9 8.7 - 10.5 mg/dL    Total Protein 8.2 6.0 - 8.4 g/dL    Albumin 3.5 3.5 - 5.2 g/dL    Total Bilirubin 0.3 0.1 - 1.0 mg/dL    Alkaline Phosphatase 86 55 - 135 U/L    AST 37 10 - 40 U/L    ALT 32 10 - 44 U/L    Anion Gap 9 8 - 16 mmol/L    eGFR >60 >60 mL/min/1.73 m^2   TSH   Result Value Ref Range    TSH 0.574 0.400 - 4.000 uIU/mL   T3   Result Value Ref Range    T3, Total 156 60 - 180 ng/dL   T4, Free   Result Value Ref Range    Free T4 1.08 0.71 - 1.51 ng/dL   Lactate Dehydrogenase   Result Value Ref Range     (H) 110 - 260 U/L   COVID-19 Rapid Screening   Result Value Ref Range    SARS-CoV-2 RNA, Amplification, Qual Negative Negative         Significant Imaging:   Imaging Results                CT Soft Tissue Neck With Contrast (Final result)  Result time 08/03/22 08:10:51      Final result by Abraham Correia MD (08/03/22 08:10:51)                   Impression:      Multifocal lymphadenopathy asymmetric towards the left most focal in the left supraclavicular and lower cervical regions favoring neoplastic disease with infectious etiology or superimposed infection not excluded.  Multiple necrotic lymph nodes with local inflammatory stranding and associated skin thickening and soft tissue fat stranding are noted in the supraclavicular region.    This report was flagged in Epic as abnormal.    The preliminary and final reports are concordant.      Electronically signed by: Abraham Correia  Date:    08/03/2022  Time:    08:10               Narrative:    EXAMINATION:  CT SOFT TISSUE NECK WITH CONTRAST    CLINICAL HISTORY:  Neck mass, nonpulsatile;    TECHNIQUE:  Axial CT images obtained throughout the region of the neck after the administration of intravenous contrast. Axial, sagittal and coronal reconstructions were performed.  Images acquired after the administration 100 mL Omnipaque 350 IV contrast.    COMPARISON:  No priors.    FINDINGS:  Left-sided predominant multifocal enlarged lymph nodes the largest non necrotic lymph nodes measure 2.0 cm in short axis as seen on series 2, image 76 and in the supraclavicular region 2.4 cm in short axis as seen on series 2, image 116.    Additionally there is bilateral axillary lymphadenopathy.    In the left lower cervical and supraclavicular region if there are numerous necrotic appearing lymph nodes with significant local inflammatory stranding possibly related to recent necrosis, although superinfection is difficult to exclude.  There is also adjacent skin thickening and soft tissue swelling.  This likely relates to the ongoing process within the lymph nodes and less likely superimposed trauma.    Otherwise the soft tissues of the nasopharynx,  oropharynx, hypopharynx and larynx are within normal limits. The parotid, submandibular, and thyroid glands demonstrate nothing unusual.    Mild carotid bifurcation atherosclerosis bilaterally without significant stenosis.  The left internal jugular vein is significantly compressed by the lymphadenopathy but appears patent throughout its course, although mass effect deviates the course.    Limited intracranial evaluation is within normal limits.  The visualized paranasal sinuses and mastoid air cells are essentially clear.    Lung apices are clear.  There are minor osseous degenerative changes, but no lytic or blastic lesions are evident.

## 2022-08-03 NOTE — CONSULTS
Chart reviewed by Dr. Guardado.       ASSESSMENT/PLAN:    Left neck swelling    After further consultation it was discussed that an excisional biopsy would increase the diagnostic value for this patient.         Thank you for the consult.

## 2022-08-03 NOTE — HPI
The pt is a 53 yo female , PMHx significant for HTN, HIV disease, Current everyday smoker  presented to the ED for evaluation of 3 weeks h/o gradually increasing swelling located on the left side the neck. Pt states it started out as a small bump on the neck below the left eat and now progress to the base of the neck and upper shoulder. Associated symptoms include fever from  Yesterday, increased pain, mild SOB on exertion and night sweat. Denies changes in appetite , weight loss, pruritus , cough, chest congestion, chest pain, palpitations, nausea , vomiting or changes in bowel habit.     CT neck demonstrated multifocal lymphadenopathy, concerning for neoplastic process. Recommend IR biopsy of lymph node for further evaluation for possible lymphoproliferative disease.    Case was discussed with Oncology per ED provider who recommends admitting the patient for rapid lymphoma workup that includes biopsy in case of aggressive lymphoma.    Pt will be placed under  service for further evaluation of left neck mass.

## 2022-08-03 NOTE — MEDICAL/APP STUDENT
Inpatient Hematology/Oncology consult note    08/03/2022    History     Chief Complaint   Patient presents with    Neck Pain     Increased left sided neck pain and swelling x2 weeks.pt reports sore throat     Ama Sol is a 51yo F patient with a PMH of HTN, HIV, cervical cancer (2015), who presented to the ED last night c/o excruciating pain from a mass in her left lateral neck. ED workup was significant for elevated LDH (483), and CT soft tissue neck demonstrated multifocal lymphadenopathy asymmetric towards the left most focal in the left supraclavicular and lower cervical regions favoring neoplastic disease with infectious etiology or superimposed infection not excluded.  Multiple necrotic lymph nodes with local inflammatory stranding and associated skin thickening and soft tissue fat stranding are noted in the supraclavicular region.    Hematology/Oncology was consulted for evaluation of neck mass due to concern for neoplastic process.    The patient was seen today resting comfortably in bed. She first noticed the mass 2 weeks ago, and reports it has increased in size since then resulting in a tight, pulling sensation. She notes some skin discoloration surrounding the mass. The mass was relatively painless until 8/1/22, when the pain began. She describes the pain as burning, 10/10 in intensity, and radiating up her left nape of her neck, and down her left arm and left upper back. She endorses chills and subjective around the time the pain began, but none before that. She endorses occasional night sweats for the past few months, but reports it may be related to menopause. She denies unintentional weight loss and changes in appetite. She complains of some new hoarseness and changes in voice. She denies any dysphasia, difficulty breathing, chest pain, SOB, weakness, fatigue, headache.      No past medical history on file.   PMH: HTN, HIV, cervical cancer s/p chemo/radiation (2015)    No past  "surgical history on file.    No family history on file.    Social History     Tobacco Use    Smoking status: Current Every Day Smoker     Packs/day: 0.50     Types: Cigarettes    Smokeless tobacco: Never Used   Substance Use Topics    Alcohol use: Not Currently    Drug use: Not Currently       Review of Systems   Constitutional: Positive for chills. Negative for activity change, appetite change, fatigue and unexpected weight change.   HENT: Positive for facial swelling and voice change. Negative for trouble swallowing.    Eyes: Negative.    Respiratory: Negative.    Cardiovascular: Negative.    Gastrointestinal: Negative.    Endocrine: Negative.    Genitourinary: Negative.    Musculoskeletal: Positive for neck pain and neck stiffness.   Skin: Negative.    Allergic/Immunologic: Negative.    Neurological: Negative.    Hematological: Negative.    Psychiatric/Behavioral: Negative.        Physical Exam   /76   Pulse (!) 115   Temp 98.9 °F (37.2 °C)   Resp 18   Ht 5' 5" (1.651 m)   Wt 84.1 kg (185 lb 8.3 oz)   SpO2 100%   BMI 30.87 kg/m²     Physical Exam    Constitutional: She appears well-developed and well-nourished.   HENT:   Head: Normocephalic and atraumatic.   Neck:       Cardiovascular: Normal rate, regular rhythm, normal heart sounds and intact distal pulses.   Pulmonary/Chest: Breath sounds normal.   Abdominal: Abdomen is soft. Bowel sounds are normal.   Musculoskeletal:      Cervical back: Edema and erythema present. Decreased range of motion.     Lymphadenopathy:     She has cervical adenopathy.   Neurological: She is alert and oriented to person, place, and time. GCS score is 15. GCS eye subscore is 4. GCS verbal subscore is 5. GCS motor subscore is 6.   Skin: Skin is warm and dry.   Psychiatric: She has a normal mood and affect. Her behavior is normal. Judgment and thought content normal.     Imaging  CT Soft Tissue Neck with Contrast (8/2/22)  FINDINGS:  Left-sided predominant multifocal " enlarged lymph nodes the largest non necrotic lymph nodes measure 2.0 cm in short axis as seen on series 2, image 76 and in the supraclavicular region 2.4 cm in short axis as seen on series 2, image 116.     Additionally there is bilateral axillary lymphadenopathy.     In the left lower cervical and supraclavicular region if there are numerous necrotic appearing lymph nodes with significant local inflammatory stranding possibly related to recent necrosis, although superinfection is difficult to exclude.  There is also adjacent skin thickening and soft tissue swelling.  This likely relates to the ongoing process within the lymph nodes and less likely superimposed trauma.     Otherwise the soft tissues of the nasopharynx, oropharynx, hypopharynx and larynx are within normal limits. The parotid, submandibular, and thyroid glands demonstrate nothing unusual.     Mild carotid bifurcation atherosclerosis bilaterally without significant stenosis.  The left internal jugular vein is significantly compressed by the lymphadenopathy but appears patent throughout its course, although mass effect deviates the course.     Limited intracranial evaluation is within normal limits.  The visualized paranasal sinuses and mastoid air cells are essentially clear.     Lung apices are clear.  There are minor osseous degenerative changes, but no lytic or blastic lesions are evident.     Impression:     Multifocal lymphadenopathy asymmetric towards the left most focal in the left supraclavicular and lower cervical regions favoring neoplastic disease with infectious etiology or superimposed infection not excluded.  Multiple necrotic lymph nodes with local inflammatory stranding and associated skin thickening and soft tissue fat stranding are noted in the supraclavicular region.    Assessment and Plan     # lymphadenopathy  multifocal lymphadenopathy on CT neck most notable left supraclavicular as well as bilateral axillary adenopathy.  No  prior imaging available.  History notable for HIV patient notes being on anti-retroviral however unclear status.  Differential diagnosis includes infectious/inflammatory and neoplastic.  Given concern for lymphoma recommendation for excisional biopsy and recommend ENT consultation.  Recommend ID consultation given HIV history to assist in anti-retroviral and assessing current status with viral load.  Ultimately will need full cross sectional imaging with PET scan as outpatient.    Will continue to follow inpatient please contact if any additional questions or concerns

## 2022-08-03 NOTE — H&P
[facility]  [facility] Medicine  History & Physical    Patient Name: Ama Sol  MRN: [id number]  Patient Class: OP- Observation  Admission Date: 8/3/2022  Attending Physician: Raquel Smith MD  Primary Care Provider: Meka Andrews         Patient information was obtained from patient and ER records.     Subjective:     Principal Problem:Mass of left side of neck    Chief Complaint:   Chief Complaint   Patient presents with    Neck Pain     Increased left sided neck pain and swelling x2 weeks.pt reports sore throat        HPI: The pt is a 51 yo female , PMHx significant for HTN, HIV disease, Current everyday smoker  presented to the ED for evaluation of 3 weeks h/o gradually increasing swelling located on the left side the neck. Pt states it started out as a small bump on the neck below the left eat and now progress to the base of the neck and upper shoulder. Associated symptoms include fever from  Yesterday, increased pain, mild SOB on exertion and night sweat. Denies changes in appetite , weight loss, pruritus , cough, chest congestion, chest pain, palpitations, nausea , vomiting or changes in bowel habit.     CT neck demonstrated multifocal lymphadenopathy, concerning for neoplastic process. Recommend IR biopsy of lymph node for further evaluation for possible lymphoproliferative disease.    Case was discussed with Oncology per ED provider who recommends admitting the patient for rapid lymphoma workup that includes biopsy in case of aggressive lymphoma.    Pt will be placed under  service for further evaluation of left neck mass.       No past medical history on file.    No past surgical history on file.    Review of patient's allergies indicates:  No Known Allergies    No current facility-administered medications on file prior to encounter.     Current Outpatient Medications on File Prior to Encounter   Medication Sig    lisinopriL-hydrochlorothiazide (PRINZIDE,ZESTORETIC) 20-25  mg Tab Take 1 tablet by mouth once daily.    meclizine (ANTIVERT) 25 mg tablet Take 1 tablet (25 mg total) by mouth 3 (three) times daily as needed.     Family History    None       Tobacco Use    Smoking status: Current Every Day Smoker     Packs/day: 0.50     Types: Cigarettes    Smokeless tobacco: Never Used   Substance and Sexual Activity    Alcohol use: Not Currently    Drug use: Not Currently    Sexual activity: Not on file     Review of Systems   Constitutional:  Negative for activity change, appetite change and fever.   HENT:  Negative for sore throat.    Eyes:  Negative for visual disturbance.   Respiratory:  Positive for shortness of breath (mild on exertion). Negative for cough and chest tightness.    Cardiovascular:  Negative for chest pain, palpitations and leg swelling.   Gastrointestinal:  Negative for abdominal distention, abdominal pain, constipation, diarrhea, nausea and vomiting.   Endocrine: Negative for polyuria.   Genitourinary:  Negative for decreased urine volume, dysuria, flank pain, frequency and hematuria.   Musculoskeletal:  Positive for neck pain. Negative for back pain and gait problem.        Neck swelling   Skin:  Negative for rash.   Neurological:  Negative for syncope, speech difficulty, weakness, light-headedness and headaches.   Psychiatric/Behavioral:  Negative for confusion, hallucinations and sleep disturbance.    Objective:     Vital Signs (Most Recent):  Temp: 97.8 °F (36.6 °C) (08/03/22 0933)  Pulse: 93 (08/03/22 0933)  Resp: 16 (08/03/22 0933)  BP: 129/76 (08/03/22 0927)  SpO2: 100 % (08/03/22 0632) Vital Signs (24h Range):  Temp:  [97.8 °F (36.6 °C)-99.8 °F (37.7 °C)] 97.8 °F (36.6 °C)  Pulse:  [] 93  Resp:  [16-18] 16  SpO2:  [100 %] 100 %  BP: (129-188)/() 129/76     Weight: 84.1 kg (185 lb 8.3 oz)  Body mass index is 30.87 kg/m².    Physical Exam  Constitutional:       General: She is not in acute distress.     Appearance: She is well-developed. She is  not diaphoretic.   HENT:      Head: Normocephalic and atraumatic.      Mouth/Throat:      Pharynx: No oropharyngeal exudate.   Eyes:      Conjunctiva/sclera: Conjunctivae normal.      Pupils: Pupils are equal, round, and reactive to light.   Neck:      Thyroid: No thyromegaly.      Vascular: No JVD.      Comments: (+) thyromegaly   (+) large swelling noted left lat neck extending to left supraclavicular area . Mild tenderness to palpation , Small area of erythema noted on the swelling near the base of the neck   Cardiovascular:      Rate and Rhythm: Normal rate and regular rhythm.      Heart sounds: Normal heart sounds. No murmur heard.  Pulmonary:      Effort: Pulmonary effort is normal. No respiratory distress.      Breath sounds: Normal breath sounds. No wheezing or rales.   Chest:      Chest wall: No tenderness.   Abdominal:      General: Bowel sounds are normal. There is no distension.      Palpations: Abdomen is soft.      Tenderness: There is no abdominal tenderness. There is no guarding or rebound.   Musculoskeletal:         General: Normal range of motion.      Cervical back: Normal range of motion and neck supple.   Lymphadenopathy:      Cervical: No cervical adenopathy.   Skin:     General: Skin is warm and dry.      Findings: No rash.   Neurological:      Mental Status: She is alert and oriented to person, place, and time.      Cranial Nerves: No cranial nerve deficit.      Sensory: No sensory deficit.      Deep Tendon Reflexes: Reflexes normal.         CRANIAL NERVES     CN III, IV, VI   Pupils are equal, round, and reactive to light.     Significant Labs:   Results for orders placed or performed during the hospital encounter of 08/03/22   CBC auto differential   Result Value Ref Range    WBC 6.24 3.90 - 12.70 K/uL    RBC 3.65 (L) 4.00 - 5.40 M/uL    Hemoglobin 11.6 (L) 12.0 - 16.0 g/dL    Hematocrit 33.9 (L) 37.0 - 48.5 %    MCV 93 82 - 98 fL    MCH 31.8 (H) 27.0 - 31.0 pg    MCHC 34.2 32.0 - 36.0 g/dL     RDW 13.2 11.5 - 14.5 %    Platelets 234 150 - 450 K/uL    MPV 9.4 9.2 - 12.9 fL    Immature Granulocytes 0.3 0.0 - 0.5 %    Gran # (ANC) 2.8 1.8 - 7.7 K/uL    Immature Grans (Abs) 0.02 0.00 - 0.04 K/uL    Lymph # 2.9 1.0 - 4.8 K/uL    Mono # 0.5 0.3 - 1.0 K/uL    Eos # 0.1 0.0 - 0.5 K/uL    Baso # 0.01 0.00 - 0.20 K/uL    nRBC 0 0 /100 WBC    Gran % 44.9 38.0 - 73.0 %    Lymph % 45.8 18.0 - 48.0 %    Mono % 7.2 4.0 - 15.0 %    Eosinophil % 1.6 0.0 - 8.0 %    Basophil % 0.2 0.0 - 1.9 %    Platelet Estimate Appears normal     Aniso Slight     Ovalocytes Occasional     Differential Method Automated    Comprehensive metabolic panel   Result Value Ref Range    Sodium 137 136 - 145 mmol/L    Potassium 4.2 3.5 - 5.1 mmol/L    Chloride 104 95 - 110 mmol/L    CO2 24 23 - 29 mmol/L    Glucose 111 (H) 70 - 110 mg/dL    BUN 11 6 - 20 mg/dL    Creatinine 0.8 0.5 - 1.4 mg/dL    Calcium 9.9 8.7 - 10.5 mg/dL    Total Protein 8.2 6.0 - 8.4 g/dL    Albumin 3.5 3.5 - 5.2 g/dL    Total Bilirubin 0.3 0.1 - 1.0 mg/dL    Alkaline Phosphatase 86 55 - 135 U/L    AST 37 10 - 40 U/L    ALT 32 10 - 44 U/L    Anion Gap 9 8 - 16 mmol/L    eGFR >60 >60 mL/min/1.73 m^2   TSH   Result Value Ref Range    TSH 0.574 0.400 - 4.000 uIU/mL   T3   Result Value Ref Range    T3, Total 156 60 - 180 ng/dL   T4, Free   Result Value Ref Range    Free T4 1.08 0.71 - 1.51 ng/dL   Lactate Dehydrogenase   Result Value Ref Range     (H) 110 - 260 U/L   COVID-19 Rapid Screening   Result Value Ref Range    SARS-CoV-2 RNA, Amplification, Qual Negative Negative         Significant Imaging:   Imaging Results               CT Soft Tissue Neck With Contrast (Final result)  Result time 08/03/22 08:10:51      Final result by Abraham Correia MD (08/03/22 08:10:51)                   Impression:      Multifocal lymphadenopathy asymmetric towards the left most focal in the left supraclavicular and lower cervical regions favoring neoplastic disease with infectious  etiology or superimposed infection not excluded.  Multiple necrotic lymph nodes with local inflammatory stranding and associated skin thickening and soft tissue fat stranding are noted in the supraclavicular region.    This report was flagged in Epic as abnormal.    The preliminary and final reports are concordant.      Electronically signed by: Abraham Masood  Date:    08/03/2022  Time:    08:10               Narrative:    EXAMINATION:  CT SOFT TISSUE NECK WITH CONTRAST    CLINICAL HISTORY:  Neck mass, nonpulsatile;    TECHNIQUE:  Axial CT images obtained throughout the region of the neck after the administration of intravenous contrast. Axial, sagittal and coronal reconstructions were performed.  Images acquired after the administration 100 mL Omnipaque 350 IV contrast.    COMPARISON:  No priors.    FINDINGS:  Left-sided predominant multifocal enlarged lymph nodes the largest non necrotic lymph nodes measure 2.0 cm in short axis as seen on series 2, image 76 and in the supraclavicular region 2.4 cm in short axis as seen on series 2, image 116.    Additionally there is bilateral axillary lymphadenopathy.    In the left lower cervical and supraclavicular region if there are numerous necrotic appearing lymph nodes with significant local inflammatory stranding possibly related to recent necrosis, although superinfection is difficult to exclude.  There is also adjacent skin thickening and soft tissue swelling.  This likely relates to the ongoing process within the lymph nodes and less likely superimposed trauma.    Otherwise the soft tissues of the nasopharynx, oropharynx, hypopharynx and larynx are within normal limits. The parotid, submandibular, and thyroid glands demonstrate nothing unusual.    Mild carotid bifurcation atherosclerosis bilaterally without significant stenosis.  The left internal jugular vein is significantly compressed by the lymphadenopathy but appears patent throughout its course, although mass  effect deviates the course.    Limited intracranial evaluation is within normal limits.  The visualized paranasal sinuses and mastoid air cells are essentially clear.    Lung apices are clear.  There are minor osseous degenerative changes, but no lytic or blastic lesions are evident.                                        Assessment/Plan:     * Mass of left side of neck  - Consult IR for evaluation of biopsy   -Consult ENT if excisional biopsy is indicated     Necrotizing inflammation of lymph node  - As above       Primary hypertension  - Resume home meds       HIV disease -  -Resume home meds  -Get CD4 count and Viral load     Cellulitis / skin and soft tissue infection left lower neck area-  -Some erythema appreciated left lower neck/supraclavicular area  -Will get blood cultures x 2  -Start antimicrobials include Rocephin and Doxycyline     VTE Risk Mitigation (From admission, onward)         Ordered     IP VTE HIGH RISK PATIENT  Once         08/03/22 0630     Place sequential compression device  Until discontinued         08/03/22 0630                   Raquel Smith MD  Department of Hospital Medicine   [facility] - Emergency Dept.

## 2022-08-03 NOTE — FIRST PROVIDER EVALUATION
"Medical screening exam completed.  I have conducted a focused provider triage encounter, findings are as follows:    Brief history of present illness:  Patient complains of neck swelling    Vitals:    08/02/22 2157   BP: (!) 188/105   BP Location: Right arm   Patient Position: Sitting   Pulse: (!) 134   Resp: 18   Temp: 99.8 °F (37.7 °C)   TempSrc: Oral   SpO2: 100%   Weight: 84.1 kg (185 lb 8.3 oz)   Height: 5' 5" (1.651 m)       Pertinent physical exam:  Gross enlargement of the thyroid bilaterally with a even more enlarged area of on the left with spur area of erythema    Brief workup plan:  CT soft tissue neck    Preliminary workup initiated; this workup will be continued and followed by the physician or advanced practice provider that is assigned to the patient when roomed.  "

## 2022-08-04 ENCOUNTER — ANESTHESIA EVENT (OUTPATIENT)
Dept: SURGERY | Facility: HOSPITAL | Age: 52
DRG: 824 | End: 2022-08-04
Payer: MEDICARE

## 2022-08-04 ENCOUNTER — ANESTHESIA (OUTPATIENT)
Dept: SURGERY | Facility: HOSPITAL | Age: 52
DRG: 824 | End: 2022-08-04
Payer: MEDICARE

## 2022-08-04 PROBLEM — L03.221 CELLULITIS OF NECK: Status: ACTIVE | Noted: 2022-08-04

## 2022-08-04 LAB
ALBUMIN SERPL BCP-MCNC: 2.8 G/DL (ref 3.5–5.2)
ALP SERPL-CCNC: 77 U/L (ref 55–135)
ALT SERPL W/O P-5'-P-CCNC: 21 U/L (ref 10–44)
ANION GAP SERPL CALC-SCNC: 9 MMOL/L (ref 8–16)
AST SERPL-CCNC: 27 U/L (ref 10–40)
BASOPHILS # BLD AUTO: 0.02 K/UL (ref 0–0.2)
BASOPHILS NFR BLD: 0.4 % (ref 0–1.9)
BILIRUB SERPL-MCNC: 0.3 MG/DL (ref 0.1–1)
BUN SERPL-MCNC: 12 MG/DL (ref 6–20)
CALCIUM SERPL-MCNC: 8.5 MG/DL (ref 8.7–10.5)
CD3+CD4+ CELLS # BLD: 661 CELLS/UL (ref 300–1400)
CD3+CD4+ CELLS NFR BLD: 26.9 % (ref 28–57)
CHLORIDE SERPL-SCNC: 107 MMOL/L (ref 95–110)
CO2 SERPL-SCNC: 22 MMOL/L (ref 23–29)
CREAT SERPL-MCNC: 0.7 MG/DL (ref 0.5–1.4)
DIFFERENTIAL METHOD: ABNORMAL
EOSINOPHIL # BLD AUTO: 0.1 K/UL (ref 0–0.5)
EOSINOPHIL NFR BLD: 2.4 % (ref 0–8)
ERYTHROCYTE [DISTWIDTH] IN BLOOD BY AUTOMATED COUNT: 13.3 % (ref 11.5–14.5)
EST. GFR  (NO RACE VARIABLE): >60 ML/MIN/1.73 M^2
GLUCOSE SERPL-MCNC: 95 MG/DL (ref 70–110)
HCT VFR BLD AUTO: 30.8 % (ref 37–48.5)
HGB BLD-MCNC: 10.3 G/DL (ref 12–16)
IMM GRANULOCYTES # BLD AUTO: 0.01 K/UL (ref 0–0.04)
IMM GRANULOCYTES NFR BLD AUTO: 0.2 % (ref 0–0.5)
LYMPHOCYTES # BLD AUTO: 2.1 K/UL (ref 1–4.8)
LYMPHOCYTES NFR BLD: 40.8 % (ref 18–48)
MCH RBC QN AUTO: 31.3 PG (ref 27–31)
MCHC RBC AUTO-ENTMCNC: 33.4 G/DL (ref 32–36)
MCV RBC AUTO: 94 FL (ref 82–98)
MONOCYTES # BLD AUTO: 0.4 K/UL (ref 0.3–1)
MONOCYTES NFR BLD: 7.8 % (ref 4–15)
NEUTROPHILS # BLD AUTO: 2.5 K/UL (ref 1.8–7.7)
NEUTROPHILS NFR BLD: 48.4 % (ref 38–73)
NRBC BLD-RTO: 0 /100 WBC
PLATELET # BLD AUTO: 208 K/UL (ref 150–450)
PMV BLD AUTO: 9.3 FL (ref 9.2–12.9)
POTASSIUM SERPL-SCNC: 4.5 MMOL/L (ref 3.5–5.1)
PROCALCITONIN SERPL IA-MCNC: 0.04 NG/ML
PROT SERPL-MCNC: 6.6 G/DL (ref 6–8.4)
RBC # BLD AUTO: 3.29 M/UL (ref 4–5.4)
SODIUM SERPL-SCNC: 138 MMOL/L (ref 136–145)
WBC # BLD AUTO: 5.1 K/UL (ref 3.9–12.7)

## 2022-08-04 PROCEDURE — 88365 PR  TISSUE HYBRIDIZATION: ICD-10-PCS | Mod: 26,,, | Performed by: PATHOLOGY

## 2022-08-04 PROCEDURE — 25000003 PHARM REV CODE 250: Performed by: ORTHOPAEDIC SURGERY

## 2022-08-04 PROCEDURE — 88377 M/PHMTRC ALYS ISHQUANT/SEMIQ: CPT | Performed by: PATHOLOGY

## 2022-08-04 PROCEDURE — 88331 PATH CONSLTJ SURG 1 BLK 1SPC: CPT | Mod: 26,,, | Performed by: PATHOLOGY

## 2022-08-04 PROCEDURE — 88312 SPECIAL STAINS GROUP 1: CPT | Performed by: PATHOLOGY

## 2022-08-04 PROCEDURE — 88307 PR  SURG PATH,LEVEL V: ICD-10-PCS | Mod: 26,,, | Performed by: PATHOLOGY

## 2022-08-04 PROCEDURE — 63600175 PHARM REV CODE 636 W HCPCS: Performed by: ANESTHESIOLOGY

## 2022-08-04 PROCEDURE — 38510 PR BIOPSY/REM LYMPH NODES, CERVICAL: ICD-10-PCS | Mod: LT,,, | Performed by: ORTHOPAEDIC SURGERY

## 2022-08-04 PROCEDURE — 38510 BIOPSY/REMOVAL LYMPH NODES: CPT | Mod: LT,,, | Performed by: ORTHOPAEDIC SURGERY

## 2022-08-04 PROCEDURE — 88364 CHG INSITU HYBRIDIZATION (FISH: ICD-10-PCS | Mod: 26,,, | Performed by: PATHOLOGY

## 2022-08-04 PROCEDURE — 88365 INSITU HYBRIDIZATION (FISH): CPT | Mod: 26,,, | Performed by: PATHOLOGY

## 2022-08-04 PROCEDURE — 87116 MYCOBACTERIA CULTURE: CPT | Performed by: INTERNAL MEDICINE

## 2022-08-04 PROCEDURE — 88364 INSITU HYBRIDIZATION (FISH): CPT | Performed by: PATHOLOGY

## 2022-08-04 PROCEDURE — 88342 CHG IMMUNOCYTOCHEMISTRY: ICD-10-PCS | Mod: 26,,, | Performed by: PATHOLOGY

## 2022-08-04 PROCEDURE — 36000707: Performed by: ORTHOPAEDIC SURGERY

## 2022-08-04 PROCEDURE — 88341 IMHCHEM/IMCYTCHM EA ADD ANTB: CPT | Mod: 59 | Performed by: PATHOLOGY

## 2022-08-04 PROCEDURE — 87075 CULTR BACTERIA EXCEPT BLOOD: CPT | Performed by: INTERNAL MEDICINE

## 2022-08-04 PROCEDURE — 99233 SBSQ HOSP IP/OBS HIGH 50: CPT | Mod: ,,, | Performed by: INTERNAL MEDICINE

## 2022-08-04 PROCEDURE — 25000003 PHARM REV CODE 250: Performed by: INTERNAL MEDICINE

## 2022-08-04 PROCEDURE — 87102 FUNGUS ISOLATION CULTURE: CPT | Performed by: INTERNAL MEDICINE

## 2022-08-04 PROCEDURE — 88342 IMHCHEM/IMCYTCHM 1ST ANTB: CPT | Mod: 26,,, | Performed by: PATHOLOGY

## 2022-08-04 PROCEDURE — 88342 IMHCHEM/IMCYTCHM 1ST ANTB: CPT | Mod: 91 | Performed by: PATHOLOGY

## 2022-08-04 PROCEDURE — 99223 1ST HOSP IP/OBS HIGH 75: CPT | Mod: ,,, | Performed by: ORTHOPAEDIC SURGERY

## 2022-08-04 PROCEDURE — 84145 PROCALCITONIN (PCT): CPT | Performed by: INTERNAL MEDICINE

## 2022-08-04 PROCEDURE — 88331 PR  PATH CONSULT IN SURG,W FRZ SEC: ICD-10-PCS | Mod: 26,,, | Performed by: PATHOLOGY

## 2022-08-04 PROCEDURE — 99223 PR INITIAL HOSPITAL CARE,LEVL III: ICD-10-PCS | Mod: ,,, | Performed by: ORTHOPAEDIC SURGERY

## 2022-08-04 PROCEDURE — 99233 PR SUBSEQUENT HOSPITAL CARE,LEVL III: ICD-10-PCS | Mod: ,,, | Performed by: INTERNAL MEDICINE

## 2022-08-04 PROCEDURE — 88342 IMHCHEM/IMCYTCHM 1ST ANTB: CPT | Performed by: PATHOLOGY

## 2022-08-04 PROCEDURE — 25000003 PHARM REV CODE 250: Performed by: EMERGENCY MEDICINE

## 2022-08-04 PROCEDURE — 88364 INSITU HYBRIDIZATION (FISH): CPT | Mod: 26,,, | Performed by: PATHOLOGY

## 2022-08-04 PROCEDURE — 88307 TISSUE EXAM BY PATHOLOGIST: CPT | Mod: 26,,, | Performed by: PATHOLOGY

## 2022-08-04 PROCEDURE — 21400001 HC TELEMETRY ROOM

## 2022-08-04 PROCEDURE — 36000706: Performed by: ORTHOPAEDIC SURGERY

## 2022-08-04 PROCEDURE — 37000009 HC ANESTHESIA EA ADD 15 MINS: Performed by: ORTHOPAEDIC SURGERY

## 2022-08-04 PROCEDURE — 63600175 PHARM REV CODE 636 W HCPCS: Performed by: NURSE ANESTHETIST, CERTIFIED REGISTERED

## 2022-08-04 PROCEDURE — 63600175 PHARM REV CODE 636 W HCPCS: Performed by: INTERNAL MEDICINE

## 2022-08-04 PROCEDURE — 88312 PR  SPECIAL STAINS,GROUP I: ICD-10-PCS | Mod: 26,,, | Performed by: PATHOLOGY

## 2022-08-04 PROCEDURE — 88307 TISSUE EXAM BY PATHOLOGIST: CPT | Mod: 59 | Performed by: PATHOLOGY

## 2022-08-04 PROCEDURE — 88341 PR IHC OR ICC EACH ADD'L SINGLE ANTIBODY  STAINPR: ICD-10-PCS | Mod: 26,,, | Performed by: PATHOLOGY

## 2022-08-04 PROCEDURE — 88365 INSITU HYBRIDIZATION (FISH): CPT | Performed by: PATHOLOGY

## 2022-08-04 PROCEDURE — 88341 IMHCHEM/IMCYTCHM EA ADD ANTB: CPT | Mod: 26,,, | Performed by: PATHOLOGY

## 2022-08-04 PROCEDURE — 80053 COMPREHEN METABOLIC PANEL: CPT | Performed by: INTERNAL MEDICINE

## 2022-08-04 PROCEDURE — 88312 SPECIAL STAINS GROUP 1: CPT | Mod: 26,,, | Performed by: PATHOLOGY

## 2022-08-04 PROCEDURE — 86480 TB TEST CELL IMMUN MEASURE: CPT | Performed by: INTERNAL MEDICINE

## 2022-08-04 PROCEDURE — 87206 SMEAR FLUORESCENT/ACID STAI: CPT | Performed by: INTERNAL MEDICINE

## 2022-08-04 PROCEDURE — 37000008 HC ANESTHESIA 1ST 15 MINUTES: Performed by: ORTHOPAEDIC SURGERY

## 2022-08-04 PROCEDURE — 36415 COLL VENOUS BLD VENIPUNCTURE: CPT | Performed by: INTERNAL MEDICINE

## 2022-08-04 PROCEDURE — 99223 PR INITIAL HOSPITAL CARE,LEVL III: ICD-10-PCS | Mod: NSCH,,, | Performed by: INTERNAL MEDICINE

## 2022-08-04 PROCEDURE — 94761 N-INVAS EAR/PLS OXIMETRY MLT: CPT

## 2022-08-04 PROCEDURE — 87070 CULTURE OTHR SPECIMN AEROBIC: CPT | Performed by: INTERNAL MEDICINE

## 2022-08-04 PROCEDURE — 71000033 HC RECOVERY, INTIAL HOUR: Performed by: ORTHOPAEDIC SURGERY

## 2022-08-04 PROCEDURE — 85025 COMPLETE CBC W/AUTO DIFF WBC: CPT | Performed by: INTERNAL MEDICINE

## 2022-08-04 PROCEDURE — 88331 PATH CONSLTJ SURG 1 BLK 1SPC: CPT | Performed by: PATHOLOGY

## 2022-08-04 PROCEDURE — 99223 1ST HOSP IP/OBS HIGH 75: CPT | Mod: NSCH,,, | Performed by: INTERNAL MEDICINE

## 2022-08-04 RX ORDER — SODIUM CHLORIDE, SODIUM LACTATE, POTASSIUM CHLORIDE, CALCIUM CHLORIDE 600; 310; 30; 20 MG/100ML; MG/100ML; MG/100ML; MG/100ML
INJECTION, SOLUTION INTRAVENOUS CONTINUOUS PRN
Status: DISCONTINUED | OUTPATIENT
Start: 2022-08-04 | End: 2022-08-04

## 2022-08-04 RX ORDER — FENTANYL CITRATE 50 UG/ML
INJECTION, SOLUTION INTRAMUSCULAR; INTRAVENOUS
Status: DISCONTINUED | OUTPATIENT
Start: 2022-08-04 | End: 2022-08-04

## 2022-08-04 RX ORDER — SODIUM CHLORIDE 0.9 % (FLUSH) 0.9 %
10 SYRINGE (ML) INJECTION
Status: DISCONTINUED | OUTPATIENT
Start: 2022-08-04 | End: 2022-08-05 | Stop reason: HOSPADM

## 2022-08-04 RX ORDER — PHENYLEPHRINE HYDROCHLORIDE 10 MG/ML
INJECTION INTRAVENOUS
Status: DISCONTINUED | OUTPATIENT
Start: 2022-08-04 | End: 2022-08-04

## 2022-08-04 RX ORDER — ONDANSETRON 2 MG/ML
INJECTION INTRAMUSCULAR; INTRAVENOUS
Status: DISCONTINUED | OUTPATIENT
Start: 2022-08-04 | End: 2022-08-04

## 2022-08-04 RX ORDER — LIDOCAINE HYDROCHLORIDE AND EPINEPHRINE 10; 10 MG/ML; UG/ML
INJECTION, SOLUTION INFILTRATION; PERINEURAL
Status: DISCONTINUED | OUTPATIENT
Start: 2022-08-04 | End: 2022-08-04 | Stop reason: HOSPADM

## 2022-08-04 RX ORDER — HYDROMORPHONE HYDROCHLORIDE 2 MG/ML
0.2 INJECTION, SOLUTION INTRAMUSCULAR; INTRAVENOUS; SUBCUTANEOUS EVERY 5 MIN PRN
Status: DISCONTINUED | OUTPATIENT
Start: 2022-08-04 | End: 2022-08-04 | Stop reason: HOSPADM

## 2022-08-04 RX ORDER — OXYCODONE AND ACETAMINOPHEN 5; 325 MG/1; MG/1
1 TABLET ORAL
Status: DISCONTINUED | OUTPATIENT
Start: 2022-08-04 | End: 2022-08-04 | Stop reason: HOSPADM

## 2022-08-04 RX ORDER — PROPOFOL 10 MG/ML
VIAL (ML) INTRAVENOUS
Status: DISCONTINUED | OUTPATIENT
Start: 2022-08-04 | End: 2022-08-04

## 2022-08-04 RX ORDER — SUCCINYLCHOLINE CHLORIDE 20 MG/ML
INJECTION INTRAMUSCULAR; INTRAVENOUS
Status: DISCONTINUED | OUTPATIENT
Start: 2022-08-04 | End: 2022-08-04

## 2022-08-04 RX ADMIN — HYDROCODONE BITARTRATE AND ACETAMINOPHEN 1 TABLET: 10; 325 TABLET ORAL at 04:08

## 2022-08-04 RX ADMIN — SODIUM CHLORIDE: 0.9 INJECTION, SOLUTION INTRAVENOUS at 07:08

## 2022-08-04 RX ADMIN — ELVITEGRAVIR, COBICISTAT, EMTRICITABINE, AND TENOFOVIR ALAFENAMIDE 1 TABLET: 150; 150; 200; 10 TABLET ORAL at 08:08

## 2022-08-04 RX ADMIN — CEFTRIAXONE 1 G: 1 INJECTION, SOLUTION INTRAVENOUS at 11:08

## 2022-08-04 RX ADMIN — SODIUM CHLORIDE, SODIUM LACTATE, POTASSIUM CHLORIDE, AND CALCIUM CHLORIDE: .6; .31; .03; .02 INJECTION, SOLUTION INTRAVENOUS at 01:08

## 2022-08-04 RX ADMIN — HYDROMORPHONE HYDROCHLORIDE 0.2 MG: 2 INJECTION INTRAMUSCULAR; INTRAVENOUS; SUBCUTANEOUS at 03:08

## 2022-08-04 RX ADMIN — LISINOPRIL 20 MG: 20 TABLET ORAL at 08:08

## 2022-08-04 RX ADMIN — PHENYLEPHRINE HYDROCHLORIDE 200 MCG: 10 INJECTION INTRAVENOUS at 02:08

## 2022-08-04 RX ADMIN — HYDROCODONE BITARTRATE AND ACETAMINOPHEN 1 TABLET: 10; 325 TABLET ORAL at 08:08

## 2022-08-04 RX ADMIN — DOXYCYCLINE HYCLATE 100 MG: 100 TABLET, COATED ORAL at 08:08

## 2022-08-04 RX ADMIN — ONDANSETRON 4 MG: 2 INJECTION, SOLUTION INTRAMUSCULAR; INTRAVENOUS at 01:08

## 2022-08-04 RX ADMIN — HYDROCHLOROTHIAZIDE 25 MG: 25 TABLET ORAL at 08:08

## 2022-08-04 RX ADMIN — SUCCINYLCHOLINE CHLORIDE 20 MG: 20 INJECTION, SOLUTION INTRAMUSCULAR; INTRAVENOUS at 01:08

## 2022-08-04 RX ADMIN — FENTANYL CITRATE 100 MCG: 50 INJECTION, SOLUTION INTRAMUSCULAR; INTRAVENOUS at 01:08

## 2022-08-04 RX ADMIN — PROPOFOL 200 MG: 10 INJECTION, EMULSION INTRAVENOUS at 01:08

## 2022-08-04 NOTE — ANESTHESIA PREPROCEDURE EVALUATION
08/04/2022  Ama Sol is a 52 y.o., female.      Pre-op Assessment    I have reviewed the Patient Summary Reports.     I have reviewed the Nursing Notes. I have reviewed the NPO Status.   I have reviewed the Medications.     Review of Systems  Anesthesia Hx:  No problems with previous Anesthesia    Social:  Smoker    Hematology/Oncology:     Oncology Normal    -- Anemia: Hematology Comments: HIV POSITIVE  Oncology Comments: Cervical lymphadenopathy  Lymphoma suspected     EENT/Dental:EENT/Dental Normal   Cardiovascular:   Hypertension, well controlled ECG has been reviewed.    Pulmonary:  Pulmonary Normal    Renal/:  Renal/ Normal     Hepatic/GI:  Hepatic/GI Normal    Musculoskeletal:  Musculoskeletal Normal    Neurological:  Neurology Normal    Endocrine:  Endocrine Normal  Obesity / BMI > 30  Dermatological:  Skin Normal    Psych:  Psychiatric Normal           Physical Exam  General: Well nourished and Alert    Airway:  Mallampati: II / II  Mouth Opening: Normal  TM Distance: Normal  Tongue: Normal  Neck ROM: Normal ROM    Dental:  Intact        Anesthesia Plan  Type of Anesthesia, risks & benefits discussed:    Anesthesia Type: Gen Supraglottic Airway, MAC  Intra-op Monitoring Plan: Standard ASA Monitors  ASA Score: 2    Ready For Surgery From Anesthesia Perspective.     .    No past medical history on file.    No past surgical history on file.    No family history on file.    Social History     Socioeconomic History    Marital status: Single   Tobacco Use    Smoking status: Current Every Day Smoker     Packs/day: 0.50     Types: Cigarettes    Smokeless tobacco: Never Used   Substance and Sexual Activity    Alcohol use: Not Currently    Drug use: Not Currently       Current Facility-Administered Medications   Medication Dose Route Frequency Provider Last Rate Last Admin     acetaminophen tablet 650 mg  650 mg Oral Q6H PRN Raquel Smith MD        aluminum-magnesium hydroxide-simethicone 200-200-20 mg/5 mL suspension 30 mL  30 mL Oral QID PRN Raquel Smith MD        bisacodyL suppository 10 mg  10 mg Rectal Daily PRN Raquel Smith MD        cefTRIAXone (ROCEPHIN) 1 g/50 mL D5W IVPB  1 g Intravenous Q24H Raquel Smith  mL/hr at 08/04/22 1107 1 g at 08/04/22 1107    doxycycline tablet 100 mg  100 mg Oral Q12H Raquel Smith MD   100 mg at 08/04/22 0837    elviteg-cob-emtri-tenof ALAFEN [id number] mg Tab 1 tablet  1 tablet Oral Daily Raquel Smith MD   1 tablet at 08/04/22 0837    lisinopriL tablet 20 mg  20 mg Oral Daily Dar Portillo MD   20 mg at 08/04/22 0837    And    hydroCHLOROthiazide tablet 25 mg  25 mg Oral Daily Dar Portillo MD   25 mg at 08/04/22 0837    HYDROcodone-acetaminophen  mg per tablet 1 tablet  1 tablet Oral Q6H PRN Raquel Smith MD   1 tablet at 08/04/22 0407    HYDROcodone-acetaminophen 5-325 mg per tablet 1 tablet  1 tablet Oral Q6H PRN Raquel Smith MD        meclizine tablet 25 mg  25 mg Oral TID PRN Raquel Smith MD        melatonin tablet 6 mg  6 mg Oral Nightly PRN Raquel Smith MD        naloxone 0.4 mg/mL injection 0.02 mg  0.02 mg Intravenous PRN Raquel Smith MD        ondansetron injection 4 mg  4 mg Intravenous Q8H PRN Raquel Smith MD        promethazine tablet 25 mg  25 mg Oral Q6H PRN Raquel Smith MD        senna-docusate 8.6-50 mg per tablet 1 tablet  1 tablet Oral Daily PRN Raquel Smith MD        sodium chloride 0.9% flush 10 mL  10 mL Intravenous Q12H PRN Raquel Smith MD        sodium chloride 0.9% flush 10 mL  10 mL Intravenous PRN Raquel Smith MD         Lab Results   Component Value Date    WBC 5.10 08/04/2022    HGB 10.3 (L) 08/04/2022    HCT 30.8 (L) 08/04/2022    MCV 94 08/04/2022     08/04/2022         Review of patient's allergies indicates:  No Known  Allergies

## 2022-08-04 NOTE — ASSESSMENT & PLAN NOTE
-Excisional bx per ENT planned today  -Will need Oncology f/u to discuss results  -plan for outpatient PET  -will s/o at this time. Please reach out to us with any questions or concerns. re consult as needed

## 2022-08-04 NOTE — SUBJECTIVE & OBJECTIVE
Interval History:     Excisional biopsy today     Review of Systems   Constitutional:  Negative for activity change, appetite change and fever.   HENT:  Negative for sore throat.    Eyes:  Negative for visual disturbance.   Respiratory:  Positive for shortness of breath (mild on exertion). Negative for cough and chest tightness.    Cardiovascular:  Negative for chest pain, palpitations and leg swelling.   Gastrointestinal:  Negative for abdominal distention, abdominal pain, constipation, diarrhea, nausea and vomiting.   Endocrine: Negative for polyuria.   Genitourinary:  Negative for decreased urine volume, dysuria, flank pain, frequency and hematuria.   Musculoskeletal:  Positive for neck pain. Negative for back pain and gait problem.        Neck swelling   Skin:  Negative for rash.   Neurological:  Negative for syncope, speech difficulty, weakness, light-headedness and headaches.   Psychiatric/Behavioral:  Negative for confusion, hallucinations and sleep disturbance.    Objective:     Vital Signs (Most Recent):  Temp: 99.7 °F (37.6 °C) (08/04/22 1600)  Pulse: 96 (08/04/22 1545)  Resp: 14 (08/04/22 1550)  BP: 113/68 (08/04/22 1600)  SpO2: 100 % (08/04/22 1545) Vital Signs (24h Range):  Temp:  [98.4 °F (36.9 °C)-100.1 °F (37.8 °C)] 99.7 °F (37.6 °C)  Pulse:  [] 96  Resp:  [14-21] 14  SpO2:  [90 %-100 %] 100 %  BP: (108-150)/(60-94) 113/68     Weight: 84.1 kg (185 lb 8.3 oz)  Body mass index is 30.87 kg/m².    Intake/Output Summary (Last 24 hours) at 8/4/2022 1604  Last data filed at 8/4/2022 1452  Gross per 24 hour   Intake 1440 ml   Output --   Net 1440 ml      Physical Exam  Constitutional:       General: She is not in acute distress.     Appearance: She is well-developed. She is not diaphoretic.   HENT:      Head: Normocephalic and atraumatic.      Mouth/Throat:      Pharynx: No oropharyngeal exudate.   Eyes:      Conjunctiva/sclera: Conjunctivae normal.      Pupils: Pupils are equal, round, and reactive to  light.   Neck:      Thyroid: No thyromegaly.      Vascular: No JVD.      Comments: (+) thyromegaly   (+) large swelling noted left lat neck extending to left supraclavicular area . Mild tenderness to palpation , Small area of erythema noted on the swelling near the base of the neck   Cardiovascular:      Rate and Rhythm: Normal rate and regular rhythm.      Heart sounds: Normal heart sounds. No murmur heard.  Pulmonary:      Effort: Pulmonary effort is normal. No respiratory distress.      Breath sounds: Normal breath sounds. No wheezing or rales.   Chest:      Chest wall: No tenderness.   Abdominal:      General: Bowel sounds are normal. There is no distension.      Palpations: Abdomen is soft.      Tenderness: There is no abdominal tenderness. There is no guarding or rebound.   Musculoskeletal:         General: Normal range of motion.      Cervical back: Normal range of motion and neck supple.   Lymphadenopathy:      Cervical: No cervical adenopathy.   Skin:     General: Skin is warm and dry.      Findings: No rash.   Neurological:      Mental Status: She is alert and oriented to person, place, and time.      Cranial Nerves: No cranial nerve deficit.      Sensory: No sensory deficit.      Deep Tendon Reflexes: Reflexes normal.       Significant Labs: All pertinent labs within the past 24 hours have been reviewed.  BMP:   Recent Labs   Lab 08/04/22 0459   GLU 95      K 4.5      CO2 22*   BUN 12   CREATININE 0.7   CALCIUM 8.5*     CBC:   Recent Labs   Lab 08/03/22 0049 08/04/22 0459   WBC 6.24 5.10   HGB 11.6* 10.3*   HCT 33.9* 30.8*    208     CMP:   Recent Labs   Lab 08/03/22 0049 08/04/22 0459    138   K 4.2 4.5    107   CO2 24 22*   * 95   BUN 11 12   CREATININE 0.8 0.7   CALCIUM 9.9 8.5*   PROT 8.2 6.6   ALBUMIN 3.5 2.8*   BILITOT 0.3 0.3   ALKPHOS 86 77   AST 37 27   ALT 32 21   ANIONGAP 9 9       Significant Imaging:

## 2022-08-04 NOTE — PROGRESS NOTES
[facility] ([facility]  Progress Note    Patient Name: Ama Sol  MRN: [id number]  Patient Class: IP- Inpatient   Admission Date: 8/3/2022  Length of Stay: 1 days  Attending Physician: Raquel Smith MD  Primary Care Provider: Meka Andrews        Subjective:     Principal Problem:Mass of left side of neck        HPI:  The pt is a 51 yo female , PMHx significant for HTN, HIV disease, Current everyday smoker  presented to the ED for evaluation of 3 weeks h/o gradually increasing swelling located on the left side the neck. Pt states it started out as a small bump on the neck below the left eat and now progress to the base of the neck and upper shoulder. Associated symptoms include fever from  Yesterday, increased pain, mild SOB on exertion and night sweat. Denies changes in appetite , weight loss, pruritus , cough, chest congestion, chest pain, palpitations, nausea , vomiting or changes in bowel habit.     CT neck demonstrated multifocal lymphadenopathy, concerning for neoplastic process. Recommend IR biopsy of lymph node for further evaluation for possible lymphoproliferative disease.    Case was discussed with Oncology per ED provider who recommends admitting the patient for rapid lymphoma workup that includes biopsy in case of aggressive lymphoma.    Pt will be placed under  service for further evaluation of left neck mass.       Overview/Hospital Course:  8/4- Excisional biopsy planned today by ENT.       Interval History:     Excisional biopsy today     Review of Systems   Constitutional:  Negative for activity change, appetite change and fever.   HENT:  Negative for sore throat.    Eyes:  Negative for visual disturbance.   Respiratory:  Positive for shortness of breath (mild on exertion). Negative for cough and chest tightness.    Cardiovascular:  Negative for chest pain, palpitations and leg swelling.   Gastrointestinal:  Negative for abdominal distention, abdominal  pain, constipation, diarrhea, nausea and vomiting.   Endocrine: Negative for polyuria.   Genitourinary:  Negative for decreased urine volume, dysuria, flank pain, frequency and hematuria.   Musculoskeletal:  Positive for neck pain. Negative for back pain and gait problem.        Neck swelling   Skin:  Negative for rash.   Neurological:  Negative for syncope, speech difficulty, weakness, light-headedness and headaches.   Psychiatric/Behavioral:  Negative for confusion, hallucinations and sleep disturbance.    Objective:     Vital Signs (Most Recent):  Temp: 99.7 °F (37.6 °C) (08/04/22 1600)  Pulse: 96 (08/04/22 1545)  Resp: 14 (08/04/22 1550)  BP: 113/68 (08/04/22 1600)  SpO2: 100 % (08/04/22 1545) Vital Signs (24h Range):  Temp:  [98.4 °F (36.9 °C)-100.1 °F (37.8 °C)] 99.7 °F (37.6 °C)  Pulse:  [] 96  Resp:  [14-21] 14  SpO2:  [90 %-100 %] 100 %  BP: (108-150)/(60-94) 113/68     Weight: 84.1 kg (185 lb 8.3 oz)  Body mass index is 30.87 kg/m².    Intake/Output Summary (Last 24 hours) at 8/4/2022 1604  Last data filed at 8/4/2022 1452  Gross per 24 hour   Intake 1440 ml   Output --   Net 1440 ml      Physical Exam  Constitutional:       General: She is not in acute distress.     Appearance: She is well-developed. She is not diaphoretic.   HENT:      Head: Normocephalic and atraumatic.      Mouth/Throat:      Pharynx: No oropharyngeal exudate.   Eyes:      Conjunctiva/sclera: Conjunctivae normal.      Pupils: Pupils are equal, round, and reactive to light.   Neck:      Thyroid: No thyromegaly.      Vascular: No JVD.      Comments: (+) thyromegaly   (+) large swelling noted left lat neck extending to left supraclavicular area . Mild tenderness to palpation , Small area of erythema noted on the swelling near the base of the neck   Cardiovascular:      Rate and Rhythm: Normal rate and regular rhythm.      Heart sounds: Normal heart sounds. No murmur heard.  Pulmonary:      Effort: Pulmonary effort is normal. No  respiratory distress.      Breath sounds: Normal breath sounds. No wheezing or rales.   Chest:      Chest wall: No tenderness.   Abdominal:      General: Bowel sounds are normal. There is no distension.      Palpations: Abdomen is soft.      Tenderness: There is no abdominal tenderness. There is no guarding or rebound.   Musculoskeletal:         General: Normal range of motion.      Cervical back: Normal range of motion and neck supple.   Lymphadenopathy:      Cervical: No cervical adenopathy.   Skin:     General: Skin is warm and dry.      Findings: No rash.   Neurological:      Mental Status: She is alert and oriented to person, place, and time.      Cranial Nerves: No cranial nerve deficit.      Sensory: No sensory deficit.      Deep Tendon Reflexes: Reflexes normal.       Significant Labs: All pertinent labs within the past 24 hours have been reviewed.  BMP:   Recent Labs   Lab 08/04/22  0459   GLU 95      K 4.5      CO2 22*   BUN 12   CREATININE 0.7   CALCIUM 8.5*     CBC:   Recent Labs   Lab 08/03/22  0049 08/04/22  0459   WBC 6.24 5.10   HGB 11.6* 10.3*   HCT 33.9* 30.8*    208     CMP:   Recent Labs   Lab 08/03/22  0049 08/04/22  0459    138   K 4.2 4.5    107   CO2 24 22*   * 95   BUN 11 12   CREATININE 0.8 0.7   CALCIUM 9.9 8.5*   PROT 8.2 6.6   ALBUMIN 3.5 2.8*   BILITOT 0.3 0.3   ALKPHOS 86 77   AST 37 27   ALT 32 21   ANIONGAP 9 9       Significant Imaging:       Assessment/Plan:      * Mass of left side of neck  - Consult IR for evaluation of biopsy   -Consult ENT if excisional biopsy is indicated     8/4-  IR consult canceled   Pt scheduled for excisional biopsy by ENT     Necrotizing inflammation of lymph node  - As above       Primary hypertension  - Resume home meds       HIV disease  This patient in known to have HIV+ status (Have detected HIV PCR but never CD4 <200 or AIDS defining illness). Labs reviewed- No results found for: CD4, No results found for:  HIVDNAPCR. Patient is on HAART. Will continue HAART. Prophylaxis was not indicated at this time- . Continue to monitor routine labs. Last CD4 count was   Lab Results   Component Value Date    ABSOLUTECD4 661 08/03/2022       We will consult Infectious disease at this time. Evaluate and treat HIV-associated diseases as indicated by specific problems listed below.     Cellulitis of neck- base of neck area, left side   - Continue antibiotic         VTE Risk Mitigation (From admission, onward)         Ordered     IP VTE HIGH RISK PATIENT  Once         08/03/22 0630     Place sequential compression device  Until discontinued         08/03/22 0630                Discharge Planning   AURELIANO:      Code Status: Full Code   Is the patient medically ready for discharge?:     Reason for patient still in hospital (select all that apply): Patient trending condition  Discharge Plan A: Home with family                  Raquel Smith MD  Department of Hospital Medicine   [facility] ([facility])

## 2022-08-04 NOTE — SUBJECTIVE & OBJECTIVE
Interval History: No acute events overnight. Having left sided neck/arm pain controlled with analgesics. Discussed with hospital med team. Excisional lymph node biopsy per ENT planned today. Laboratory review stable.     Oncology Treatment Plan:   [Could not find a treatment plan. This SmartLink may be configured incorrectly. Contact a  for help.]    Medications:  Continuous Infusions:  Scheduled Meds:   cefTRIAXone (ROCEPHIN) IVPB  1 g Intravenous Q24H    doxycycline  100 mg Oral Q12H    elviteg-cob-emtri-tenof ALAFEN  1 tablet Oral Daily    lisinopriL  20 mg Oral Daily    And    hydroCHLOROthiazide  25 mg Oral Daily     PRN Meds:acetaminophen, aluminum-magnesium hydroxide-simethicone, bisacodyL, HYDROcodone-acetaminophen, HYDROcodone-acetaminophen, meclizine, melatonin, naloxone, ondansetron, promethazine, senna-docusate 8.6-50 mg, sodium chloride 0.9%, sodium chloride 0.9%     Review of Systems   Constitutional:  Positive for activity change, appetite change and fatigue. Negative for chills, fever and unexpected weight change.   HENT:  Negative for congestion, mouth sores, nosebleeds, sore throat, trouble swallowing and voice change.    Eyes:  Negative for visual disturbance.   Respiratory:  Negative for cough, chest tightness, shortness of breath and wheezing.    Cardiovascular:  Negative for chest pain, palpitations and leg swelling.   Gastrointestinal:  Negative for abdominal distention, abdominal pain, blood in stool, constipation, diarrhea, nausea and vomiting.   Genitourinary:  Negative for difficulty urinating, dysuria and hematuria.   Musculoskeletal:  Positive for neck pain and neck stiffness (limited ROM due to pain). Negative for arthralgias, back pain and myalgias.   Skin:  Negative for pallor, rash and wound.   Neurological:  Negative for dizziness, syncope, weakness and headaches.   Hematological:  Negative for adenopathy. Does not bruise/bleed easily.   Psychiatric/Behavioral:   The patient is nervous/anxious.    Objective:     Vital Signs (Most Recent):  Temp: 98.4 °F (36.9 °C) (08/04/22 0725)  Pulse: 95 (08/04/22 0725)  Resp: 16 (08/04/22 0805)  BP: 115/87 (08/04/22 0725)  SpO2: 98 % (08/04/22 0805) Vital Signs (24h Range):  Temp:  [97.9 °F (36.6 °C)-100.1 °F (37.8 °C)] 98.4 °F (36.9 °C)  Pulse:  [] 95  Resp:  [16-20] 16  SpO2:  [98 %-100 %] 98 %  BP: (112-141)/(71-94) 115/87     Weight: 84.1 kg (185 lb 8.3 oz)  Body mass index is 30.87 kg/m².  Body surface area is 1.96 meters squared.      Intake/Output Summary (Last 24 hours) at 8/4/2022 1101  Last data filed at 8/3/2022 1800  Gross per 24 hour   Intake 290 ml   Output --   Net 290 ml       Physical Exam  Vitals reviewed.   Constitutional:       Appearance: She is well-developed.   HENT:      Head: Normocephalic.      Right Ear: Hearing and external ear normal. No drainage.      Left Ear: Hearing and external ear normal. No drainage.      Nose: Nose normal.   Eyes:      General: Lids are normal. No scleral icterus.        Right eye: No discharge.         Left eye: No discharge.      Conjunctiva/sclera: Conjunctivae normal.   Neck:      Thyroid: No thyroid mass.   Cardiovascular:      Rate and Rhythm: Normal rate and regular rhythm.      Heart sounds: Normal heart sounds. No murmur heard.  Pulmonary:      Effort: Pulmonary effort is normal. No respiratory distress.      Breath sounds: Normal breath sounds. No wheezing or rales.   Abdominal:      General: Bowel sounds are normal. There is no distension.      Palpations: Abdomen is soft.      Tenderness: There is no abdominal tenderness.   Genitourinary:     Comments: deferred  Musculoskeletal:      Cervical back: Edema and tenderness present. Decreased range of motion.   Lymphadenopathy:      Head:      Right side of head: No submandibular, preauricular or posterior auricular adenopathy.      Left side of head: No submandibular, preauricular or posterior auricular adenopathy.       Cervical: Cervical adenopathy present.      Right cervical: Superficial cervical adenopathy present.      Left cervical: Superficial cervical adenopathy present.   Skin:     General: Skin is warm and dry.   Neurological:      Mental Status: She is alert and oriented to person, place, and time.   Psychiatric:         Speech: Speech normal.         Behavior: Behavior normal. Behavior is cooperative.         Thought Content: Thought content normal.       Significant Labs:   BMP:   Recent Labs   Lab 08/03/22 0049 08/04/22 0459   * 95    138   K 4.2 4.5    107   CO2 24 22*   BUN 11 12   CREATININE 0.8 0.7   CALCIUM 9.9 8.5*   , CBC:   Recent Labs   Lab 08/03/22 0049 08/04/22 0459   WBC 6.24 5.10   HGB 11.6* 10.3*   HCT 33.9* 30.8*    208   , LDH: No results for input(s): LDHCSF, BFSOURCE in the last 48 hours., LFTs:   Recent Labs   Lab 08/03/22 0049 08/04/22 0459   ALT 32 21   AST 37 27   ALKPHOS 86 77   BILITOT 0.3 0.3   PROT 8.2 6.6   ALBUMIN 3.5 2.8*   , Tumor Markers: No results for input(s): PSA, CEA, , AFPTM, SX4806,  in the last 48 hours.    Invalid input(s): ALGTM, Urine Studies: No results for input(s): COLORU, APPEARANCEUA, PHUR, SPECGRAV, PROTEINUA, GLUCUA, KETONESU, BILIRUBINUA, OCCULTUA, NITRITE, UROBILINOGEN, LEUKOCYTESUR, RBCUA, WBCUA, BACTERIA, SQUAMEPITHEL, HYALINECASTS in the last 48 hours.    Invalid input(s): WRIGHTSUR, and All pertinent labs from the last 24 hours have been reviewed.    Diagnostic Results:  I have reviewed all pertinent imaging results/findings within the past 24 hours.

## 2022-08-04 NOTE — TRANSFER OF CARE
"Anesthesia Transfer of Care Note    Patient: Ama Sol    Procedure(s) Performed: Procedure(s) (LRB):  BIOPSY, LYMPH NODE, CERVICAL (Left)    Patient location: PACU    Anesthesia Type: general    Transport from OR: Transported from OR on room air with adequate spontaneous ventilation    Post pain: adequate analgesia    Post assessment: no apparent anesthetic complications and tolerated procedure well    Post vital signs: stable    Level of consciousness: awake and alert    Nausea/Vomiting: no nausea/vomiting    Complications: none    Transfer of care protocol was followed      Last vitals:   Visit Vitals  /84 (BP Location: Right arm, Patient Position: Sitting)   Pulse 101   Temp 36.9 °C (98.4 °F) (Oral)   Resp 16   Ht 5' 5" (1.651 m)   Wt 84.1 kg (185 lb 8.3 oz)   SpO2 100%   Breastfeeding No   BMI 30.87 kg/m²     "

## 2022-08-04 NOTE — PLAN OF CARE
Pt AAOx4. No signs of distress. Able to verbalize needs and follow commands. Calm and Cooperative. Reports pain to left lateral neck. States pain is 6/10. Prn pain meds given.  Vital signs stable.  On room air.  PIV is patent. Pt continent of b/b. independent. Pt remains free of falls. Meds given as prescribed.  POC reviewed with pt and pt verbalized understanding. Pt educated on safety and fall risk. Verbalized understanding. Interventions implemented as appropriate. Pt able to turn self. Encouraged pt  to turn frequently. Resting quietly in bed.Bed low. Side rails up x2, wheels locked, call light within reach.

## 2022-08-04 NOTE — PLAN OF CARE
[facility] - Tele[facility] (Hospital)  Initial Discharge Assessment       Primary Care Provider: Meka Andrews    Admission Diagnosis: HIV disease [B20]  Mass of left side of neck [R22.1]  Necrotizing inflammation of lymph node [L04.9]    Admission Date: 8/3/2022  Expected Discharge Date:     Discharge Barriers Identified: None    Payor: [facility] MCARE / Plan: [facility] DUAL COMPLETE HMO SNP / Product Type: Medicare Advantage /     Extended Emergency Contact Information  Primary Emergency Contact: Selma Wu  Mobile Phone: [phone]  Relation: Relative  Preferred language: English   needed? No    Discharge Plan A: Home with family       No Pharmacies Listed    Initial Assessment (most recent)     Adult Discharge Assessment - 08/04/22 1102        Discharge Assessment    Assessment Type Discharge Planning Assessment     Confirmed/corrected address, phone number and insurance Yes     Confirmed Demographics Correct on Facesheet     Source of Information patient     Communicated AURELIANO with patient/caregiver Date not available/Unable to determine     Reason For Admission L neck mass     Lives With child([place]), adult     Facility Arrived From: home     Do you expect to return to your current living situation? Yes     Do you have help at home or someone to help you manage your care at home? Yes     Who are your caregiver(s) and their phone number(s)? lives with 20yr old son     Prior to hospitilization cognitive status: Alert/Oriented     Current cognitive status: Alert/Oriented     Walking or Climbing Stairs Difficulty none     Equipment Currently Used at Home none     Readmission within 30 days? No     Patient currently being followed by outpatient case management? No     Do you currently have service(s) that help you manage your care at home? No     Do you take prescription medications? Yes     Do you have prescription coverage? Yes     Do you have any problems affording any of your prescribed  medications? No     Is the patient taking medications as prescribed? yes     Who is going to help you get home at discharge? son or Aunt Selma     How do you get to doctors appointments? car, drives self     Are you on dialysis? No     Do you take coumadin? No     Discharge Plan A Home with family     DME Needed Upon Discharge  none     Discharge Plan discussed with: Patient     Discharge Barriers Identified None                 Anticipated DC Dispo: home with son  Prior Level of Function: independent, denies use of DME  PCP: Dr. Andrews  Comments:  CM met with patient at bedside to introduce role and discuss d/c planning. Patient is independent, lives with 20yr old son. Patient states her Aunt Selma will provide transportation home upon d/c. CM following.

## 2022-08-04 NOTE — ASSESSMENT & PLAN NOTE
- Consult IR for evaluation of biopsy   -Consult ENT if excisional biopsy is indicated     8/4-  IR consult canceled   Pt scheduled for excisional biopsy by ENT

## 2022-08-04 NOTE — HPI
Ama Sol is a 51yo F patient with a PMH of HTN, HIV, cervical cancer (2015), who presented to the ED last night c/o excruciating pain from a mass in her left lateral neck. ED workup was significant for elevated LDH (483), and CT soft tissue neck demonstrated multifocal lymphadenopathy asymmetric towards the left most focal in the left supraclavicular and lower cervical regions favoring neoplastic disease with infectious etiology or superimposed infection not excluded.  Multiple necrotic lymph nodes with local inflammatory stranding and associated skin thickening and soft tissue fat stranding are noted in the supraclavicular region.     Hematology/Oncology was consulted for evaluation of neck mass due to concern for neoplastic process.     The patient was seen today resting comfortably in bed. She first noticed the mass 2 weeks ago, and reports it has increased in size since then resulting in a tight, pulling sensation. She notes some skin discoloration surrounding the mass. The mass was relatively painless until 8/1/22, when the pain began. She describes the pain as burning, 10/10 in intensity, and radiating up her left nape of her neck, and down her left arm and left upper back. She endorses chills and subjective around the time the pain began, but none before that. She endorses occasional night sweats for the past few months, but reports it may be related to menopause. She denies unintentional weight loss and changes in appetite. She complains of some new hoarseness and changes in voice. She denies any dysphasia, difficulty breathing, chest pain, SOB, weakness, fatigue, headache.        No past medical history on file.   PMH: HTN, HIV, cervical cancer s/p chemo/radiation (2015)     No past surgical history on file.     No family history on file.

## 2022-08-04 NOTE — HOSPITAL COURSE
8/4- Excisional biopsy planned today by ENT.   8/5- S/P S/p excision of neck mass by ENT, Dr. Keturah Garcia. Per report, frozen consistent with likely infectious pathology according to ENT and further management after obtaining final path report and culture /sensitivity. Antibiotic transition to oral Augmentin and Doxycycline. Pt is examined this morning and deemed stable for discharge with follow up in the ENT clinic, ID Dr. Dunn and PCP.

## 2022-08-04 NOTE — ANESTHESIA POSTPROCEDURE EVALUATION
Anesthesia Post Evaluation    Patient: Ama Sol    Procedure(s) Performed: Procedure(s) (LRB):  BIOPSY, LYMPH NODE, CERVICAL (Left)    Final Anesthesia Type: general      Patient location during evaluation: PACU  Patient participation: Yes- Able to Participate  Level of consciousness: awake  Post-procedure vital signs: reviewed and stable  Pain management: adequate  Airway patency: patent    PONV status at discharge: No PONV  Anesthetic complications: no      Cardiovascular status: hemodynamically stable  Respiratory status: unassisted  Hydration status: euvolemic  Follow-up not needed.          Vitals Value Taken Time   /68 08/04/22 1600   Temp 37.6 °C (99.7 °F) 08/04/22 1600   Pulse 100 08/04/22 1602   Resp 15 08/04/22 1601   SpO2 100 % 08/04/22 1602   Vitals shown include unvalidated device data.      Event Time   Out of Recovery 08/04/2022 16:04:03         Pain/Karmen Score: Pain Rating Prior to Med Admin: 4 (8/4/2022  3:50 PM)  Karmen Score: 10 (8/4/2022  3:45 PM)

## 2022-08-04 NOTE — PROGRESS NOTES
S/p excision of neck mass, frozen consistent with likely infectious pathology.   OK to d/c when appropriate, and will follow in ENT clinic next week.  Please arrange ID followup as well.

## 2022-08-04 NOTE — ASSESSMENT & PLAN NOTE
This patient in known to have HIV+ status (Have detected HIV PCR but never CD4 <200 or AIDS defining illness). Labs reviewed- No results found for: CD4, No results found for: HIVDNAPCR. Patient is on HAART. Will continue HAART. Prophylaxis was not indicated at this time- . Continue to monitor routine labs. Last CD4 count was   Lab Results   Component Value Date    ABSOLUTECD4 661 08/03/2022       We will consult Infectious disease at this time. Evaluate and treat HIV-associated diseases as indicated by specific problems listed below.

## 2022-08-04 NOTE — OP NOTE
BIOPSY, LYMPH NODE, CERVICAL  Procedure Note    Ama Sol  8/4/2022    Surgeon:  Dr. Keturah Garcia  Assistant:  None    Preoperative diagnosis:  Cervical adenopathy    Postoperative diagnosis:  Same    Procedure:  BIOPSY, LYMPH NODE, CERVICAL    Findings:   Bulky bilateral lymphadenopathy, frozen did not show malignant cells     Anesthesia:  General endotracheal anesthesia    Blood loss:  5 mL    Specimen:  Neck mass    Medications administered in the OR:  See anesthesia record    Implants:  None    Indications for procedure:  Patient presented to ED with complaints of   Risks and benefits of the procedure were extensively discussed with the patient, and they elected to proceed with the procedure.    Procedure in Detail:  After appropriate consents were obtained, the patient was taken to the operating room and placed in a supine position.    The patient's left neck was prepped and draped in sterile fashion.  Using a 15 blade an approximately 3 cm incision was made if her level five just posterior to the SCM.  The deep lymph node was then identified approximately 2 cm in dimension.  This was then dissected from the surrounding tissue with minimal blood loss.  Care was taken using a bipolar cautery as well as a baby.  The deepest portion of the lymph node was somewhat liquified and necrotic, with foul smelling debris.  Numerous other nodes were noted in the field.  The specimen was event for frozen section analysis as well as tissue culture and permanent analysis.  The incision site was copiously irrigated with normal saline, and the incision was closed in a multilayered fashion.     The patient's care was then returned to anesthesia, and the patient was awakened and extubated without difficulty, and brought to the recovery room in good condition.

## 2022-08-04 NOTE — PLAN OF CARE
Patient updated on plan of care. Instructed patient to use call light for assistance, call light in reach.Hourly rounding performed, bed locked, side rails up, and in low position.  Patient returned from PACU, report received from Jenny BREAUX. Puncture on left neck covered with gauze and Tegaderm; no drainage or redness noted on dressing. Education provided, questions answered as of now. Patient remained free from falls during shift. Will continue to monitor per unit practice/policy. Vanessa Bazan RN.

## 2022-08-04 NOTE — CONSULTS
[facility] [facility] ([facility])  Otorhinolaryngology-Head & Neck Surgery  Consult Note    Patient Name: Ama Sol  MRN: [id number]  Code Status: Full Code  Admission Date: 8/3/2022  Hospital Length of Stay: 1 days  Attending Physician: Raquel Smith MD  Primary Care Provider: Meka Andrews    Consults  Subjective:     Chief Complaint/Reason for Admission: cervical lymphadenopathy    History of Present Illness: Patient presented to ED with complaints of rapidly enlarging neck nodes.  She has significant pain associated with the nodes, difficult to move neck.  No issues with breathing or swallowing.  HIV+, CD4 661.  Smokes one half pack daily.    Medications:  Continuous Infusions:  Scheduled Meds:   cefTRIAXone (ROCEPHIN) IVPB  1 g Intravenous Q24H    doxycycline  100 mg Oral Q12H    elviteg-cob-emtri-tenof ALAFEN  1 tablet Oral Daily    lisinopriL  20 mg Oral Daily    And    hydroCHLOROthiazide  25 mg Oral Daily     PRN Meds:acetaminophen, aluminum-magnesium hydroxide-simethicone, bisacodyL, HYDROcodone-acetaminophen, HYDROcodone-acetaminophen, meclizine, melatonin, naloxone, ondansetron, promethazine, senna-docusate 8.6-50 mg, sodium chloride 0.9%, sodium chloride 0.9%, sodium chloride 0.9%, sodium chloride 0.9%     No current facility-administered medications on file prior to encounter.     Current Outpatient Medications on File Prior to Encounter   Medication Sig    lisinopriL-hydrochlorothiazide (PRINZIDE,ZESTORETIC) 20-25 mg Tab Take 1 tablet by mouth once daily.    meclizine (ANTIVERT) 25 mg tablet Take 1 tablet (25 mg total) by mouth 3 (three) times daily as needed.       Review of patient's allergies indicates:  No Known Allergies    History reviewed. No pertinent past medical history.  History reviewed. No pertinent surgical history.  Family History    None       Tobacco Use    Smoking status: Current Every Day Smoker     Packs/day: 0.50     Types: Cigarettes    Smokeless tobacco:  Never Used   Substance and Sexual Activity    Alcohol use: Not Currently    Drug use: Not Currently    Sexual activity: Not on file     Review of Systems   Musculoskeletal: Positive for neck pain and neck stiffness.     Objective:     Vital Signs (Most Recent):  Temp: 98.4 °F (36.9 °C) (08/04/22 1104)  Pulse: 101 (08/04/22 1104)  Resp: 16 (08/04/22 1104)  BP: 138/84 (08/04/22 1104)  SpO2: 100 % (08/04/22 1104) Vital Signs (24h Range):  Temp:  [98.2 °F (36.8 °C)-100.1 °F (37.8 °C)] 98.4 °F (36.9 °C)  Pulse:  [] 101  Resp:  [16-20] 16  SpO2:  [98 %-100 %] 100 %  BP: (112-141)/(75-94) 138/84     Weight: 84.1 kg (185 lb 8.3 oz)  Body mass index is 30.87 kg/m².        Physical Exam  Constitutional:       Appearance: She is well-developed. She is not diaphoretic.   HENT:      Head: Normocephalic and atraumatic.      Right Ear: No decreased hearing noted.      Left Ear: No decreased hearing noted.      Nose: Nose normal.   Eyes:      General: Lids are normal.      Conjunctiva/sclera: Conjunctivae normal.   Neck:      Comments: Bulky bilateral cervical lymphadenopathy, left worse than right, extending from level 2 through level 5  Pulmonary:      Effort: Pulmonary effort is normal. No respiratory distress.   Lymphadenopathy:      Cervical: Cervical adenopathy present.   Skin:     Findings: No rash.   Neurological:      Mental Status: She is alert.         Significant Labs:  CBC:   Recent Labs   Lab 08/04/22  0459   WBC 5.10   RBC 3.29*   HGB 10.3*   HCT 30.8*      MCV 94   MCH 31.3*   MCHC 33.4       Significant Diagnostics:  CT: I have reviewed all pertinent results/findings within the past 24 hours and my personal findings are:  bulky bilateral lymphadenopathy    Assessment/Plan:     Active Diagnoses:    Diagnosis Date Noted POA    PRINCIPAL PROBLEM:  Mass of left side of neck [R22.1] 08/03/2022 Yes    Necrotizing inflammation of lymph node [L04.9] 08/03/2022 Yes    Primary hypertension [I10] 08/03/2022  Yes    HIV disease [B20] 08/03/2022 Yes      Problems Resolved During this Admission:     VTE Risk Mitigation (From admission, onward)         Ordered     IP VTE HIGH RISK PATIENT  Once         08/03/22 0630     Place sequential compression device  Until discontinued         08/03/22 0630                Thank you for your consult. will proceed to OR for excisional biopsy, will send for fresh and frozen and culture    Keturah Garcia MD  Otorhinolaryngology-Head & Neck Surgery  [facility] ([facility])

## 2022-08-04 NOTE — PROGRESS NOTES
[facility])  Hematology/Oncology  Progress Note    Patient Name: Ama Sol  Admission Date: 8/3/2022  Hospital Length of Stay: 1 days  Code Status: Full Code     Subjective:     HPI:  Ama Sol is a 51yo F patient with a PMH of HTN, HIV, cervical cancer (2015), who presented to the ED last night c/o excruciating pain from a mass in her left lateral neck. ED workup was significant for elevated LDH (483), and CT soft tissue neck demonstrated multifocal lymphadenopathy asymmetric towards the left most focal in the left supraclavicular and lower cervical regions favoring neoplastic disease with infectious etiology or superimposed infection not excluded.  Multiple necrotic lymph nodes with local inflammatory stranding and associated skin thickening and soft tissue fat stranding are noted in the supraclavicular region.     Hematology/Oncology was consulted for evaluation of neck mass due to concern for neoplastic process.     The patient was seen today resting comfortably in bed. She first noticed the mass 2 weeks ago, and reports it has increased in size since then resulting in a tight, pulling sensation. She notes some skin discoloration surrounding the mass. The mass was relatively painless until 8/1/22, when the pain began. She describes the pain as burning, 10/10 in intensity, and radiating up her left nape of her neck, and down her left arm and left upper back. She endorses chills and subjective around the time the pain began, but none before that. She endorses occasional night sweats for the past few months, but reports it may be related to menopause. She denies unintentional weight loss and changes in appetite. She complains of some new hoarseness and changes in voice. She denies any dysphasia, difficulty breathing, chest pain, SOB, weakness, fatigue, headache.        No past medical history on file.   PMH: HTN, HIV, cervical cancer s/p chemo/radiation (2015)      No past surgical history on file.     No family history on file.         Interval History: No acute events overnight. Having left sided neck/arm pain controlled with analgesics. Discussed with hospital med team. Excisional lymph node biopsy per ENT planned today. Laboratory review stable.     Oncology Treatment Plan:   [Could not find a treatment plan. This SmartLink may be configured incorrectly. Contact a  for help.]    Medications:  Continuous Infusions:  Scheduled Meds:   cefTRIAXone (ROCEPHIN) IVPB  1 g Intravenous Q24H    doxycycline  100 mg Oral Q12H    elviteg-cob-emtri-tenof ALAFEN  1 tablet Oral Daily    lisinopriL  20 mg Oral Daily    And    hydroCHLOROthiazide  25 mg Oral Daily     PRN Meds:acetaminophen, aluminum-magnesium hydroxide-simethicone, bisacodyL, HYDROcodone-acetaminophen, HYDROcodone-acetaminophen, meclizine, melatonin, naloxone, ondansetron, promethazine, senna-docusate 8.6-50 mg, sodium chloride 0.9%, sodium chloride 0.9%     Review of Systems   Constitutional:  Positive for activity change, appetite change and fatigue. Negative for chills, fever and unexpected weight change.   HENT:  Negative for congestion, mouth sores, nosebleeds, sore throat, trouble swallowing and voice change.    Eyes:  Negative for visual disturbance.   Respiratory:  Negative for cough, chest tightness, shortness of breath and wheezing.    Cardiovascular:  Negative for chest pain, palpitations and leg swelling.   Gastrointestinal:  Negative for abdominal distention, abdominal pain, blood in stool, constipation, diarrhea, nausea and vomiting.   Genitourinary:  Negative for difficulty urinating, dysuria and hematuria.   Musculoskeletal:  Positive for neck pain and neck stiffness (limited ROM due to pain). Negative for arthralgias, back pain and myalgias.   Skin:  Negative for pallor, rash and wound.   Neurological:  Negative for dizziness, syncope, weakness and headaches.   Hematological:   Negative for adenopathy. Does not bruise/bleed easily.   Psychiatric/Behavioral:  The patient is nervous/anxious.    Objective:     Vital Signs (Most Recent):  Temp: 98.4 °F (36.9 °C) (08/04/22 0725)  Pulse: 95 (08/04/22 0725)  Resp: 16 (08/04/22 0805)  BP: 115/87 (08/04/22 0725)  SpO2: 98 % (08/04/22 0805) Vital Signs (24h Range):  Temp:  [97.9 °F (36.6 °C)-100.1 °F (37.8 °C)] 98.4 °F (36.9 °C)  Pulse:  [] 95  Resp:  [16-20] 16  SpO2:  [98 %-100 %] 98 %  BP: (112-141)/(71-94) 115/87     Weight: 84.1 kg (185 lb 8.3 oz)  Body mass index is 30.87 kg/m².  Body surface area is 1.96 meters squared.      Intake/Output Summary (Last 24 hours) at 8/4/2022 1101  Last data filed at 8/3/2022 1800  Gross per 24 hour   Intake 290 ml   Output --   Net 290 ml       Physical Exam  Vitals reviewed.   Constitutional:       Appearance: She is well-developed.   HENT:      Head: Normocephalic.      Right Ear: Hearing and external ear normal. No drainage.      Left Ear: Hearing and external ear normal. No drainage.      Nose: Nose normal.   Eyes:      General: Lids are normal. No scleral icterus.        Right eye: No discharge.         Left eye: No discharge.      Conjunctiva/sclera: Conjunctivae normal.   Neck:      Thyroid: No thyroid mass.   Cardiovascular:      Rate and Rhythm: Normal rate and regular rhythm.      Heart sounds: Normal heart sounds. No murmur heard.  Pulmonary:      Effort: Pulmonary effort is normal. No respiratory distress.      Breath sounds: Normal breath sounds. No wheezing or rales.   Abdominal:      General: Bowel sounds are normal. There is no distension.      Palpations: Abdomen is soft.      Tenderness: There is no abdominal tenderness.   Genitourinary:     Comments: deferred  Musculoskeletal:      Cervical back: Edema and tenderness present. Decreased range of motion.   Lymphadenopathy:      Head:      Right side of head: No submandibular, preauricular or posterior auricular adenopathy.      Left side  of head: No submandibular, preauricular or posterior auricular adenopathy.      Cervical: Cervical adenopathy present.      Right cervical: Superficial cervical adenopathy present.      Left cervical: Superficial cervical adenopathy present.   Skin:     General: Skin is warm and dry.   Neurological:      Mental Status: She is alert and oriented to person, place, and time.   Psychiatric:         Speech: Speech normal.         Behavior: Behavior normal. Behavior is cooperative.         Thought Content: Thought content normal.       Significant Labs:   BMP:   Recent Labs   Lab 08/03/22 0049 08/04/22 0459   * 95    138   K 4.2 4.5    107   CO2 24 22*   BUN 11 12   CREATININE 0.8 0.7   CALCIUM 9.9 8.5*   , CBC:   Recent Labs   Lab 08/03/22 0049 08/04/22 0459   WBC 6.24 5.10   HGB 11.6* 10.3*   HCT 33.9* 30.8*    208   , LDH: No results for input(s): LDHCSF, BFSOURCE in the last 48 hours., LFTs:   Recent Labs   Lab 08/03/22 0049 08/04/22 0459   ALT 32 21   AST 37 27   ALKPHOS 86 77   BILITOT 0.3 0.3   PROT 8.2 6.6   ALBUMIN 3.5 2.8*   , Tumor Markers: No results for input(s): PSA, CEA, , AFPTM, UF7291,  in the last 48 hours.    Invalid input(s): ALGTM, Urine Studies: No results for input(s): COLORU, APPEARANCEUA, PHUR, SPECGRAV, PROTEINUA, GLUCUA, KETONESU, BILIRUBINUA, OCCULTUA, NITRITE, UROBILINOGEN, LEUKOCYTESUR, RBCUA, WBCUA, BACTERIA, SQUAMEPITHEL, HYALINECASTS in the last 48 hours.    Invalid input(s): WRIGHTSUR, and All pertinent labs from the last 24 hours have been reviewed.    Diagnostic Results:  I have reviewed all pertinent imaging results/findings within the past 24 hours.    Assessment/Plan:     * Mass of left side of neck  -Excisional bx per ENT planned today  -Will need Oncology f/u to discuss results  -plan for outpatient PET  -will s/o at this time. Please reach out to us with any questions or concerns. re consult as needed    HIV disease  -ID following  -On  antivirals. Patient reports medication compliance         Thank you for your consult. I will sign off. Please contact us if you have any additional questions.     Latosha Noel NP  Hematology/Oncology  [facility] ([facility])    35 minutes of total time spent on the encounter, which includes face to face time and non-face to face time preparing to see the patient (eg, review of tests), Obtaining and/or reviewing separately obtained history, Documenting clinical information in the electronic or other health record, Independently interpreting results (not separately reported) and communicating results to the patient/family/caregiver, or Care coordination (not separately reported).

## 2022-08-05 VITALS
HEART RATE: 99 BPM | HEIGHT: 65 IN | DIASTOLIC BLOOD PRESSURE: 86 MMHG | WEIGHT: 185.5 LBS | BODY MASS INDEX: 30.91 KG/M2 | RESPIRATION RATE: 18 BRPM | SYSTOLIC BLOOD PRESSURE: 134 MMHG | TEMPERATURE: 99 F | OXYGEN SATURATION: 96 %

## 2022-08-05 LAB
ALBUMIN SERPL BCP-MCNC: 2.7 G/DL (ref 3.5–5.2)
ALP SERPL-CCNC: 77 U/L (ref 55–135)
ALT SERPL W/O P-5'-P-CCNC: 24 U/L (ref 10–44)
ANION GAP SERPL CALC-SCNC: 9 MMOL/L (ref 8–16)
AST SERPL-CCNC: 29 U/L (ref 10–40)
BASOPHILS # BLD AUTO: 0.02 K/UL (ref 0–0.2)
BASOPHILS NFR BLD: 0.3 % (ref 0–1.9)
BILIRUB SERPL-MCNC: 0.3 MG/DL (ref 0.1–1)
BUN SERPL-MCNC: 11 MG/DL (ref 6–20)
CALCIUM SERPL-MCNC: 8.6 MG/DL (ref 8.7–10.5)
CHLORIDE SERPL-SCNC: 105 MMOL/L (ref 95–110)
CO2 SERPL-SCNC: 23 MMOL/L (ref 23–29)
CREAT SERPL-MCNC: 0.8 MG/DL (ref 0.5–1.4)
DIFFERENTIAL METHOD: ABNORMAL
EOSINOPHIL # BLD AUTO: 0.1 K/UL (ref 0–0.5)
EOSINOPHIL NFR BLD: 1.7 % (ref 0–8)
ERYTHROCYTE [DISTWIDTH] IN BLOOD BY AUTOMATED COUNT: 13 % (ref 11.5–14.5)
EST. GFR  (NO RACE VARIABLE): >60 ML/MIN/1.73 M^2
GAMMA INTERFERON BACKGROUND BLD IA-ACNC: 0.03 IU/ML
GLUCOSE SERPL-MCNC: 97 MG/DL (ref 70–110)
HCT VFR BLD AUTO: 31.4 % (ref 37–48.5)
HGB BLD-MCNC: 10.3 G/DL (ref 12–16)
IMM GRANULOCYTES # BLD AUTO: 0.02 K/UL (ref 0–0.04)
IMM GRANULOCYTES NFR BLD AUTO: 0.3 % (ref 0–0.5)
LYMPHOCYTES # BLD AUTO: 2.1 K/UL (ref 1–4.8)
LYMPHOCYTES NFR BLD: 36.6 % (ref 18–48)
M TB IFN-G CD4+ BCKGRND COR BLD-ACNC: 0.02 IU/ML
MAGNESIUM SERPL-MCNC: 2 MG/DL (ref 1.6–2.6)
MCH RBC QN AUTO: 31.2 PG (ref 27–31)
MCHC RBC AUTO-ENTMCNC: 32.8 G/DL (ref 32–36)
MCV RBC AUTO: 95 FL (ref 82–98)
MITOGEN IGNF BCKGRD COR BLD-ACNC: 3.08 IU/ML
MONOCYTES # BLD AUTO: 0.5 K/UL (ref 0.3–1)
MONOCYTES NFR BLD: 8.4 % (ref 4–15)
NEUTROPHILS # BLD AUTO: 3 K/UL (ref 1.8–7.7)
NEUTROPHILS NFR BLD: 52.7 % (ref 38–73)
NRBC BLD-RTO: 0 /100 WBC
PHOSPHATE SERPL-MCNC: 2.7 MG/DL (ref 2.7–4.5)
PLATELET # BLD AUTO: 207 K/UL (ref 150–450)
PMV BLD AUTO: 9.7 FL (ref 9.2–12.9)
POTASSIUM SERPL-SCNC: 4.2 MMOL/L (ref 3.5–5.1)
PROT SERPL-MCNC: 6.7 G/DL (ref 6–8.4)
RBC # BLD AUTO: 3.3 M/UL (ref 4–5.4)
SODIUM SERPL-SCNC: 137 MMOL/L (ref 136–145)
TB GOLD PLUS: NEGATIVE
TB2 - NIL: 0 IU/ML
WBC # BLD AUTO: 5.73 K/UL (ref 3.9–12.7)

## 2022-08-05 PROCEDURE — 85025 COMPLETE CBC W/AUTO DIFF WBC: CPT | Performed by: INTERNAL MEDICINE

## 2022-08-05 PROCEDURE — 25000003 PHARM REV CODE 250: Performed by: ORTHOPAEDIC SURGERY

## 2022-08-05 PROCEDURE — 36415 COLL VENOUS BLD VENIPUNCTURE: CPT | Performed by: INTERNAL MEDICINE

## 2022-08-05 PROCEDURE — 80053 COMPREHEN METABOLIC PANEL: CPT | Performed by: INTERNAL MEDICINE

## 2022-08-05 PROCEDURE — 84100 ASSAY OF PHOSPHORUS: CPT | Performed by: INTERNAL MEDICINE

## 2022-08-05 PROCEDURE — 83735 ASSAY OF MAGNESIUM: CPT | Performed by: INTERNAL MEDICINE

## 2022-08-05 RX ORDER — DOXYCYCLINE 100 MG/1
100 CAPSULE ORAL 2 TIMES DAILY
Qty: 14 CAPSULE | Refills: 0 | Status: SHIPPED | OUTPATIENT
Start: 2022-08-05 | End: 2022-08-12

## 2022-08-05 RX ORDER — AMOXICILLIN AND CLAVULANATE POTASSIUM 875; 125 MG/1; MG/1
1 TABLET, FILM COATED ORAL EVERY 12 HOURS
Qty: 14 TABLET | Refills: 0 | Status: SHIPPED | OUTPATIENT
Start: 2022-08-05 | End: 2022-08-12

## 2022-08-05 RX ORDER — ELVITEGRAVIR, COBICISTAT, EMTRICITABINE, AND TENOFOVIR ALAFENAMIDE 150; 150; 200; 10 MG/1; MG/1; MG/1; MG/1
1 TABLET ORAL DAILY
Start: 2022-08-06

## 2022-08-05 RX ADMIN — HYDROCODONE BITARTRATE AND ACETAMINOPHEN 1 TABLET: 5; 325 TABLET ORAL at 06:08

## 2022-08-05 RX ADMIN — HYDROCODONE BITARTRATE AND ACETAMINOPHEN 1 TABLET: 10; 325 TABLET ORAL at 08:08

## 2022-08-05 RX ADMIN — ELVITEGRAVIR, COBICISTAT, EMTRICITABINE, AND TENOFOVIR ALAFENAMIDE 1 TABLET: 150; 150; 200; 10 TABLET ORAL at 08:08

## 2022-08-05 RX ADMIN — HYDROCHLOROTHIAZIDE 25 MG: 25 TABLET ORAL at 08:08

## 2022-08-05 RX ADMIN — LISINOPRIL 20 MG: 20 TABLET ORAL at 08:08

## 2022-08-05 NOTE — ASSESSMENT & PLAN NOTE
Will do CT guided biopsy, send Gold quantiferon-  Send biopsy for AFB  Lymphoma, Castleman disease , TB are all in the differential

## 2022-08-05 NOTE — PLAN OF CARE
Discharge education reviewed with patient. Questions answered as of now. Discharge medications delivered to patient bedside. LDA's removed per provider order. Follow up appointments scheduled/referral made. Patient educated to contact PCP for hospital follow up appt. Patient remained free from falls during shift. Transport requested, patient discharged with personal belongings.

## 2022-08-05 NOTE — CONSULTS
[facility])  Infectious Disease  Consult Note    Patient Name: Ama Sol  MRN: [id number]  Admission Date: 8/3/2022  Hospital Length of Stay: 2 days  Attending Physician: Raquel Smith MD  Primary Care Provider: Meka Andrews     Isolation Status: No active isolations    Patient information was obtained from patient, past medical records and ER records.      Consults  Assessment/Plan:     * Mass of left side of neck  Will follow tissue diagnosis-Follow ENT    HIV disease  Will resume home regime of Genvoya,check cd4 , hiv viral load    Primary hypertension  Will resume home regime, low salt diet , on zestoretic     Necrotizing inflammation of lymph node  Will do CT guided biopsy, send Gold quantiferon-  Send biopsy for AFB  Lymphoma, Castleman disease , TB are all in the differential        Thank you for your consult. I will follow-up with patient. Please contact us if you have any additional questions.    Derrell Dunn MD  Infectious Disease  [facility] ([facility])    Subjective:     Principal Problem: Mass of left side of neck    HPI: 52 y.o. female patient with history of HIV- well controlled as per patient, follows up with Dr Kumar admitted with worsening neck swelling,subjective fever and diaphoresis .There is no history of contact with anyone with chronic cough .  Labs and imaging test -    Multifocal lymphadenopathy asymmetric towards the left most focal in the left supraclavicular and lower cervical regions favoring neoplastic disease with infectious etiology or superimposed infection not excluded.  Multiple necrotic lymph nodes with local inflammatory stranding and associated skin thickening and soft tissue fat stranding are noted in the supraclavicular region.         History reviewed. No pertinent past medical history.    History reviewed. No pertinent surgical history.    Review of patient's allergies indicates:  No Known Allergies    Medications:  Medications  Prior to Admission   Medication Sig    lisinopriL-hydrochlorothiazide (PRINZIDE,ZESTORETIC) 20-25 mg Tab Take 1 tablet by mouth once daily.    meclizine (ANTIVERT) 25 mg tablet Take 1 tablet (25 mg total) by mouth 3 (three) times daily as needed.     Antibiotics (From admission, onward)                Start     Stop Route Frequency Ordered    08/03/22 1200  cefTRIAXone (ROCEPHIN) 1 g/50 mL D5W IVPB         -- IV Every 24 hours (non-standard times) 08/03/22 1051    08/03/22 1200  doxycycline tablet 100 mg         08/08 0859 Oral Every 12 hours 08/03/22 1051          Antifungals (From admission, onward)                None          Antivirals (From admission, onward)          Stop Route Frequency     Genvoya         -- Oral Daily               There is no immunization history on file for this patient.    Family History    None       Social History     Socioeconomic History    Marital status: Single   Tobacco Use    Smoking status: Current Every Day Smoker     Packs/day: 0.50     Types: Cigarettes    Smokeless tobacco: Never Used   Substance and Sexual Activity    Alcohol use: Not Currently    Drug use: Not Currently     Review of Systems   Constitutional:  Negative for chills and fatigue.   HENT:  Negative for congestion, ear pain, facial swelling, sinus pressure and sore throat.         Neck swelling    Eyes:  Negative for pain.   Respiratory:  Negative for apnea, chest tightness, shortness of breath and stridor.    Cardiovascular:  Negative for chest pain, palpitations and leg swelling.   Gastrointestinal:  Negative for abdominal distention, abdominal pain, diarrhea and nausea.   Endocrine: Negative for polydipsia and polyphagia.   Genitourinary:  Negative for decreased urine volume, difficulty urinating, frequency and genital sores.   Musculoskeletal:  Negative for arthralgias and gait problem.   Neurological:  Negative for light-headedness and headaches.   Hematological:  Negative for adenopathy.    Psychiatric/Behavioral:  Negative for agitation, confusion and decreased concentration.    Objective:     Vital Signs (Most Recent):  Temp: 98.6 °F (37 °C) (08/05/22 0407)  Pulse: 109 (08/05/22 0407)  Resp: 18 (08/05/22 0640)  BP: (!) 159/90 (08/05/22 0407)  SpO2: 98 % (08/05/22 0407)   Vital Signs (24h Range):  Temp:  [96.9 °F (36.1 °C)-99.7 °F (37.6 °C)] 98.6 °F (37 °C)  Pulse:  [] 109  Resp:  [14-21] 18  SpO2:  [90 %-100 %] 98 %  BP: ()/(60-90) 159/90     Weight: 84.1 kg (185 lb 8.3 oz)  Body mass index is 30.87 kg/m².    Estimated Creatinine Clearance: 87.2 mL/min (based on SCr of 0.8 mg/dL).    Physical Exam  Vitals and nursing note reviewed.   Constitutional:       Appearance: She is well-developed.   HENT:      Head: Normocephalic and atraumatic.   Eyes:      Conjunctiva/sclera: Conjunctivae normal.      Pupils: Pupils are equal, round, and reactive to light.   Neck:      Thyroid: No thyroid mass or thyromegaly.      Comments: See pic  Cardiovascular:      Rate and Rhythm: Normal rate.      Heart sounds: Normal heart sounds.   Pulmonary:      Effort: Pulmonary effort is normal. No accessory muscle usage or respiratory distress.      Breath sounds: Normal breath sounds.   Abdominal:      General: Bowel sounds are normal.      Palpations: Abdomen is soft. There is no mass.      Tenderness: There is no abdominal tenderness.   Musculoskeletal:         General: Normal range of motion.   Skin:     Findings: No rash.   Neurological:      Mental Status: She is alert and oriented to person, place, and time.       Significant Labs: BMP:   Recent Labs   Lab 08/05/22  0500   GLU 97      K 4.2      CO2 23   BUN 11   CREATININE 0.8   CALCIUM 8.6*   MG 2.0     CBC:   Recent Labs   Lab 08/04/22  0459 08/05/22  0500   WBC 5.10 5.73   HGB 10.3* 10.3*   HCT 30.8* 31.4*    207     CMP:   Recent Labs   Lab 08/04/22  0459 08/05/22  0500    137   K 4.5 4.2    105   CO2 22* 23   GLU 95 97    BUN 12 11   CREATININE 0.7 0.8   CALCIUM 8.5* 8.6*   PROT 6.6 6.7   ALBUMIN 2.8* 2.7*   BILITOT 0.3 0.3   ALKPHOS 77 77   AST 27 29   ALT 21 24   ANIONGAP 9 9       Significant Imaging: I have reviewed all pertinent imaging results/findings within the past 24 hours.

## 2022-08-05 NOTE — HPI
52 y.o. female patient with history of HIV- well controlled as per patient, follows up with Dr Kumar admitted with worsening neck swelling,subjective fever and diaphoresis .There is no history of contact with anyone with chronic cough .  Labs and imaging test -    Multifocal lymphadenopathy asymmetric towards the left most focal in the left supraclavicular and lower cervical regions favoring neoplastic disease with infectious etiology or superimposed infection not excluded.  Multiple necrotic lymph nodes with local inflammatory stranding and associated skin thickening and soft tissue fat stranding are noted in the supraclavicular region.

## 2022-08-05 NOTE — PLAN OF CARE
[facility] (Hospital)  Discharge Final Note    Primary Care Provider: Meka Andrews    Expected Discharge Date: 8/5/2022    Final Discharge Note (most recent)     Final Note - 08/05/22 1308        Final Note    Assessment Type Final Discharge Note     Anticipated Discharge Disposition Home or Self Care     Hospital Resources/Appts/Education Provided Post-Acute resouces added to AVS                 Important Message from Medicare             Contact Info     Meka Andrews   Specialty: Family Medicine   Relationship: PCP - General    [place] [facility]  [address] [facility] [address]   Phone: [phone]       Next Steps: Schedule an appointment as soon as possible for a visit in 3 day(s)    Instructions: Discharge follow up    Derrell Dunn MD   Specialty: Infectious Diseases, Hospitalist    [address] [place] [id number]   Phone: [phone]       Next Steps: Schedule an appointment as soon as possible for a visit in 1 week(s)    Instructions: Infectious Disease follow up    Maria Del Rosario Van MD   Specialty: Hematology and Oncology    [address] [place] [address]   Phone: [phone]       Next Steps: Schedule an appointment as soon as possible for a visit in 1 week(s)    Instructions: Oncology follow up    Keturah Garcia MD   Specialty: Otolaryngology    [address] [place] [address]   [place] [address]   Phone: [phone]       Next Steps: Schedule an appointment as soon as possible for a visit in 1 week(s)    Instructions: Post op ENT follow up        PCP f/u: non-[facility]  Ambulatory referrals placed for ENT and ID f/u, clinics to contact patient for appt scheduling.  Patient has Oncology f/u Aug 15th

## 2022-08-05 NOTE — SUBJECTIVE & OBJECTIVE
History reviewed. No pertinent past medical history.    History reviewed. No pertinent surgical history.    Review of patient's allergies indicates:  No Known Allergies    Medications:  Medications Prior to Admission   Medication Sig    lisinopriL-hydrochlorothiazide (PRINZIDE,ZESTORETIC) 20-25 mg Tab Take 1 tablet by mouth once daily.    meclizine (ANTIVERT) 25 mg tablet Take 1 tablet (25 mg total) by mouth 3 (three) times daily as needed.     Antibiotics (From admission, onward)                Start     Stop Route Frequency Ordered    08/03/22 1200  cefTRIAXone (ROCEPHIN) 1 g/50 mL D5W IVPB         -- IV Every 24 hours (non-standard times) 08/03/22 1051    08/03/22 1200  doxycycline tablet 100 mg         08/08 0859 Oral Every 12 hours 08/03/22 1051          Antifungals (From admission, onward)                None          Antivirals (From admission, onward)          Stop Route Frequency     Genvoya         -- Oral Daily               There is no immunization history on file for this patient.    Family History    None       Social History     Socioeconomic History    Marital status: Single   Tobacco Use    Smoking status: Current Every Day Smoker     Packs/day: 0.50     Types: Cigarettes    Smokeless tobacco: Never Used   Substance and Sexual Activity    Alcohol use: Not Currently    Drug use: Not Currently     Review of Systems   Constitutional:  Negative for chills and fatigue.   HENT:  Negative for congestion, ear pain, facial swelling, sinus pressure and sore throat.         Neck swelling    Eyes:  Negative for pain.   Respiratory:  Negative for apnea, chest tightness, shortness of breath and stridor.    Cardiovascular:  Negative for chest pain, palpitations and leg swelling.   Gastrointestinal:  Negative for abdominal distention, abdominal pain, diarrhea and nausea.   Endocrine: Negative for polydipsia and polyphagia.   Genitourinary:  Negative for decreased urine volume, difficulty urinating, frequency and  genital sores.   Musculoskeletal:  Negative for arthralgias and gait problem.   Neurological:  Negative for light-headedness and headaches.   Hematological:  Negative for adenopathy.   Psychiatric/Behavioral:  Negative for agitation, confusion and decreased concentration.    Objective:     Vital Signs (Most Recent):  Temp: 98.6 °F (37 °C) (08/05/22 0407)  Pulse: 109 (08/05/22 0407)  Resp: 18 (08/05/22 0640)  BP: (!) 159/90 (08/05/22 0407)  SpO2: 98 % (08/05/22 0407)   Vital Signs (24h Range):  Temp:  [96.9 °F (36.1 °C)-99.7 °F (37.6 °C)] 98.6 °F (37 °C)  Pulse:  [] 109  Resp:  [14-21] 18  SpO2:  [90 %-100 %] 98 %  BP: ()/(60-90) 159/90     Weight: 84.1 kg (185 lb 8.3 oz)  Body mass index is 30.87 kg/m².    Estimated Creatinine Clearance: 87.2 mL/min (based on SCr of 0.8 mg/dL).    Physical Exam  Vitals and nursing note reviewed.   Constitutional:       Appearance: She is well-developed.   HENT:      Head: Normocephalic and atraumatic.   Eyes:      Conjunctiva/sclera: Conjunctivae normal.      Pupils: Pupils are equal, round, and reactive to light.   Neck:      Thyroid: No thyroid mass or thyromegaly.      Comments: See pic  Cardiovascular:      Rate and Rhythm: Normal rate.      Heart sounds: Normal heart sounds.   Pulmonary:      Effort: Pulmonary effort is normal. No accessory muscle usage or respiratory distress.      Breath sounds: Normal breath sounds.   Abdominal:      General: Bowel sounds are normal.      Palpations: Abdomen is soft. There is no mass.      Tenderness: There is no abdominal tenderness.   Musculoskeletal:         General: Normal range of motion.   Skin:     Findings: No rash.   Neurological:      Mental Status: She is alert and oriented to person, place, and time.       Significant Labs: BMP:   Recent Labs   Lab 08/05/22  0500   GLU 97      K 4.2      CO2 23   BUN 11   CREATININE 0.8   CALCIUM 8.6*   MG 2.0     CBC:   Recent Labs   Lab 08/04/22  0459 08/05/22  0500    WBC 5.10 5.73   HGB 10.3* 10.3*   HCT 30.8* 31.4*    207     CMP:   Recent Labs   Lab 08/04/22  0459 08/05/22  0500    137   K 4.5 4.2    105   CO2 22* 23   GLU 95 97   BUN 12 11   CREATININE 0.7 0.8   CALCIUM 8.5* 8.6*   PROT 6.6 6.7   ALBUMIN 2.8* 2.7*   BILITOT 0.3 0.3   ALKPHOS 77 77   AST 27 29   ALT 21 24   ANIONGAP 9 9       Significant Imaging: I have reviewed all pertinent imaging results/findings within the past 24 hours.

## 2022-08-06 ENCOUNTER — NURSE TRIAGE (OUTPATIENT)
Dept: ADMINISTRATIVE | Facility: CLINIC | Age: 52
End: 2022-08-06
Payer: MEDICARE

## 2022-08-06 NOTE — TELEPHONE ENCOUNTER
Pt contacted for Post discharge day 1 escalation - C/O pain with movement from lying to standing pain. Also some sinus drainage. Main concern is when to remove dressing and wound care instructions. NT did review AVS and notes but no instructions found.  Post op protocol used and NT spoke with provider and advised pt as per MD instructions.    1055 Spoke with Dr. Garcia - Instructions received; Ok for pt to remove dressing , shower and gently clean with soap and water, pat dry, and leave open to air. Pt may use OTC antibiotic ointment if desired.     Pt instructed to call [facility] for worsening of symptoms or health questions.    Reason for Disposition   [1] Caller has URGENT question AND [2] triager unable to answer question    Additional Information   Negative: [1] Major abdominal surgical incision AND [2] wound gaping open AND [3] visible internal organs   Negative: Sounds like a life-threatening emergency to the triager   Negative: Patient has a Negative Pressure Wound Therapy device   Negative: Patient is followed by a wound clinic or wound specialist for this wound   Negative: [1] Bleeding from incision AND [2] won't stop after 10 minutes of direct pressure   Negative: [1] Widespread rash AND [2] bright red, sunburn-like   Negative: Severe pain in the incision   Negative: [1] Incision gaping open AND [2] < 48 hours since wound re-opened   Negative: [1] Incision gaping open AND [2] length of opening > 2 inches (5 cm)   Negative: Patient sounds very sick or weak to the triager   Negative: Sounds like a serious complication to the triager   Negative: Fever > 100.4 F (38.0 C)   Negative: [1] Incision looks infected (spreading redness, pain) AND [2] fever > 99.5 F (37.5 C)   Negative: [1] Incision looks infected (spreading redness, pain) AND [2] large red area (> 2 in. or 5 cm)   Negative: [1] Incision looks infected (spreading redness, pain) AND [2] face wound   Negative: [1] Red streak runs from the  incision AND [2] longer than 1 inch (2.5 cm)   Negative: [1] Pus or bad-smelling fluid draining from incision AND [2] no fever   Negative: [1] Post-op pain AND [2] not controlled with pain medications   Negative: Dressing soaked with blood or body fluid (e.g., drainage)   Negative: [1] Raised bruise and [2] size > 2 inches (5 cm) and expanding    Protocols used: POST-OP INCISION SYMPTOMS AND [id number]

## 2022-08-08 LAB
BACTERIA BLD CULT: NORMAL
BACTERIA BLD CULT: NORMAL
BACTERIA SPEC AEROBE CULT: NO GROWTH
HIV1 RNA # PLAS NAA DL=20: 264 COPIES/ML

## 2022-08-08 NOTE — PHYSICIAN QUERY
PT Name: Ama Sol  MR #: [id number]    DOCUMENTATION CLARIFICATION     CDS/: Lynnette Damon RN BSN              Contact information: [email or web address]    This form is a permanent document in the medical record.     Query Date: August 8, 2022      By submitting this query, we are merely seeking further clarification of documentation. Please utilize your independent clinical judgment when addressing the question(s) below.    The Medical Record contains the following:   Indicators Supporting Clinical Findings Location in Medical Record   x HIV or AIDS Past medical history HIV    HIV disease  This patient in known to have HIV+ status (Have detected HIV PCR but never CD4 <200 or AIDS defining illness).     HIV disease 8/3/22 H&P    8/4/22 Progress Note        8/5/22 Infectious Disease Consult   x CD4 Count          CD4%  Absolute CD4 661   CD4% [place] T Cell 26.9 8/3/22 Lab    History of or current Opportunistic Infection     x Acute/Chronic Illness Cellulitis of neck- base of neck area, left side     Necrotizing inflammation of lymph node 8/4/22 Progress Note    8/5/22 Infectious Disease Consult    AIDS-Defining Condition or HIV-Related Illness documented     x Medication elviteg-cob-emtri-tenof ALAFEN [phone] mg Tab 1 tablet daily oral 8/3/22 MAR   x Other Pre-op Diagnosis: HIV disease [B20] Mass of left side of neck [R22.1] Procedure(s): BIOPSY, LYMPH NODE, CERVICAL Number of specimens:  1 Name of specimens:   1) Left neck mass--perm. 8/4/22 Pathology In Progress     The clinical guidelines noted are only a system guideline. It does not replace the providers clinical judgment.    The following are AIDS-Defining Conditions or HIV-Related Illnesses (noted in more recent literature as Opportunistic Illnesses in HIV Infection):   Candidiasis of bronchi, trachea, or lungs Lymphoma, Burkitt (or equivalent term)   Candidiasis of esophagus Lymphoma, immunoblastic (or equivalent term)    Cervical cancer, invasive only among adults, adolescents, and children aged > 6 years Lymphoma, primary, of brain   Coccidioidomycosis, disseminated or extrapulmonary Mycobacterium avium complex or Mycobacterium kansasii, disseminated or extrapulmonary   Cryptococcosis, extrapulmonary Mycobacterium tuberculosis of any site, pulmonary (only among adults, adolescents, and children aged >6 years), disseminated, or extrapulmonary   Cryptosporidiosis, chronic intestinal (>1 months duration) Mycobacterium, other species or unidentified species, disseminated or extrapulmonary   Cytomegalovirus disease (other than liver, spleen, or nodes), onset at age >1 month Pneumocystitis jirovecii (previously known as Pneumocystis carinii) pneumonia   Cytomegalovirus retinitis (with loss of vision) Pneumonia, recurrent only among adults, adolescents, and children aged >6 years   Encephalopathy attributed to HIV Progressive multifocal leukoencephalopathy   Herpes simplex: chronic ulcers (>1 months  duration) or bronchitis, pneumonitis, or esophagitis (onset at age >1 month) Salmonella septicemia, recurrent   Histoplasmosis, disseminated or extrapulmonary Toxoplasmosis of brain, onset at age >1 month   Isosporiasis, chronic intestinal (>1 months duration) Wasting syndrome attributed to HIV   Kaposi sarcoma    Once a patient is diagnosed with HIV Disease or AIDS the diagnosis still stands even if, after treatment, the CD4+ T-lymphocyte count rises to above 200 µL or other AIDS-Defining Conditions or HIV-Related Illnesses are resolved.         Provider, please clarify the patient's HIV Status.  [ x  ] Asymptomatic HIV Infection - Positive Status Only without any history of or current AIDS-Defining Condition or HIV-Related Illness and without a history of or current CD4+ T-Lymphocyte count < 200 uL or total percentage of T-lymphocytes <14 (percentage is used only if the count is missing)   [   ] HIV Disease or AIDS, please check the  applicable support for the diagnosis:    [   ] Patient has or had a history of CD4+ T-Lymphocyte count < 200 uL or total percentage of T-lymphocytes <14 (percentage is used only if the count is missing)   [   ] Patient has or had a history of AIDS-Defining Condition or HIV-Related Illness (please specify if not previously documented): ______________   [   ] Patient has or had a history of CD4+ T-Lymphocyte count < 200 uL or total percentage of T-lymphocytes <14 (percentage is used only if the count is missing) and an AIDS-Defining Condition or HIV-Related Illness (please specify if not previously documented): ______________      [   ] Other (please specify):________   [  ] Clinically Undetermined       Please document in your progress notes daily for the duration of treatment until resolved and include in your discharge summary.    References:  Revised Surveillance Case Definition for HIV Infection - [place], 2014. (2014, April 11). Retrieved November 09, 2020, from https://www.cdc.gov/mmwr/preview/mmwrhtml/ru0735x0.htm?s_cid=pg2066l9_y  QUINTIN Bansal MD. (2019, April). Techniques and interpretation of measurement of the CD4 cell count in HIV-infected patients ([id number] CASTILLO Garcia MD & [id number] WING Marcos MD, MPH, Eds.). Retrieved November 09, 2020, from https://www.Pylba/contents/techniques-and-interpretation-kv-bgyndyxijoo-yo-the-cd4-cell-count-in-hiv-infected-patients?search=hiv%20disease%20cd4%20count&source=search_result&selectedTitle=1~150&usage_type=default&display_rank=1#[id number]  Form 63041

## 2022-08-10 ENCOUNTER — TELEPHONE (OUTPATIENT)
Dept: ADMINISTRATIVE | Facility: CLINIC | Age: 52
End: 2022-08-10
Payer: MEDICARE

## 2022-08-10 NOTE — TELEPHONE ENCOUNTER
Attempted to return pt call from Tracker request. Message left on VM to numbers in Epic. Will try back.

## 2022-08-10 NOTE — DISCHARGE SUMMARY
[facility] Medicine  Discharge Summary      Patient Name: Ama Sol  MRN: [id number]  Patient Class: IP- Inpatient  Admission Date: 8/3/2022  Hospital Length of Stay: 2 days  Discharge Date and Time: 8/5/2022  3:18 PM  Attending Physician: No att. providers found   Discharging Provider: Raquel Smith MD  Primary Care Provider: Meka Andrews      HPI:   The pt is a 53 yo female , PMHx significant for HTN, HIV disease, Current everyday smoker  presented to the ED for evaluation of 3 weeks h/o gradually increasing swelling located on the left side the neck. Pt states it started out as a small bump on the neck below the left eat and now progress to the base of the neck and upper shoulder. Associated symptoms include fever from  Yesterday, increased pain, mild SOB on exertion and night sweat. Denies changes in appetite , weight loss, pruritus , cough, chest congestion, chest pain, palpitations, nausea , vomiting or changes in bowel habit.     CT neck demonstrated multifocal lymphadenopathy, concerning for neoplastic process. Recommend IR biopsy of lymph node for further evaluation for possible lymphoproliferative disease.    Case was discussed with Oncology per ED provider who recommends admitting the patient for rapid lymphoma workup that includes biopsy in case of aggressive lymphoma.    Pt will be placed under  service for further evaluation of left neck mass.       Procedure(s) (LRB):  BIOPSY, LYMPH NODE, CERVICAL (Left)      Hospital Course:   8/4- Excisional biopsy planned today by ENT.   8/5- S/P S/p excision of neck mass by ENT, Dr. Keturah Garcia. Per report, frozen consistent with likely infectious pathology according to ENT and further management after obtaining final path report and culture /sensitivity. Antibiotic transition to oral Augmentin and Doxycycline. Pt is examined this morning and deemed stable for discharge with follow up in the ENT clinic, ID   Shaun and PCP.          Goals of Care Treatment Preferences:  Code Status: Full Code      Consults:   Consults (From admission, onward)        Status Ordering Provider     Inpatient consult to ENT  Once        Provider:  (Not yet assigned)    Completed NUVIA CAMPOS     Inpatient consult to Interventional Radiology  Once        Provider:  (Not yet assigned)    Completed NUVIA CAMPOS     Inpatient consult to Hematology/Oncology  Once        Provider:  Maria Del Rosario Van MD    Completed NUVIA CAMPOS          No new Assessment & Plan notes have been filed under this hospital service since the last note was generated.  Service: Hospital Medicine    Final Active Diagnoses:    Diagnosis Date Noted POA    PRINCIPAL PROBLEM:  Mass of left side of neck [R22.1] 08/03/2022 Yes    Necrotizing inflammation of lymph node [L04.9] 08/03/2022 Yes    Primary hypertension [I10] 08/03/2022 Yes    HIV disease [B20] 08/03/2022 Yes    Cellulitis of neck- base of neck area, left side  [L03.221] 08/04/2022 Yes      Problems Resolved During this Admission:       Discharged Condition: stable    Disposition: Home or Self Care    Follow Up:   Follow-up Information     Meka Andrews. Schedule an appointment as soon as possible for a visit in 3 day(s).    Specialty: Family Medicine  Why: Discharge follow up  Contact information:  [address] [facility] [address] [phone]             Derrell Dunn MD. Schedule an appointment as soon as possible for a visit in 1 week(s).    Specialties: Infectious Diseases, Hospitalist  Why: Infectious Disease follow up  Contact information:  [address] [facility] [address] [phone]             Maria Del Rosario Van MD. Schedule an appointment as soon as possible for a visit in 1 week(s).    Specialty: Hematology and Oncology  Why: Oncology follow up  Contact information:  [address]  [place] [address] [phone]             Keturah Garcia MD. Schedule an  appointment as soon as possible for a visit in 1 week(s).    Specialty: Otolaryngology  Why: Post op ENT follow up  Contact information:  [address] [place] [address] [address] [address]  [phone]                       Patient Instructions:      Ambulatory referral/consult to ENT   Standing Status: Future   Referral Priority: Routine Referral Type: Consultation   Referral Reason: Specialty Services Required   Requested Specialty: Otolaryngology   Number of Visits Requested: 1     Ambulatory referral/consult to Infectious Disease   Standing Status: Future   Referral Priority: Routine Referral Type: Consultation   Referral Reason: Specialty Services Required   Requested Specialty: Infectious Diseases   Number of Visits Requested: 1     Diet Adult Regular     Activity as tolerated       Significant Diagnostic Studies: Labs: BMP: No results for input(s): GLU, NA, K, CL, CO2, BUN, CREATININE, CALCIUM, MG in the last 48 hours., CMP No results for input(s): NA, K, CL, CO2, GLU, BUN, CREATININE, CALCIUM, PROT, ALBUMIN, BILITOT, ALKPHOS, AST, ALT, ANIONGAP, ESTGFRAFRICA, EGFRNONAA in the last 48 hours. and CBC No results for input(s): WBC, HGB, HCT, PLT in the last 48 hours.    Pending Diagnostic Studies:     Procedure Component Value Units Date/Time    Specimen to Pathology, Surgery ENT [[id number]] Collected: 08/04/22 1440    Order Status: Sent Lab Status: In process Updated: 08/05/22 1018    Specimen: Tissue     Specimen to Pathology, Surgery ENT [[id number]] Collected: 08/04/22 1450    Order Status: Sent Lab Status: In process Updated: 08/04/22 1452    Specimen: Tissue          Medications:  Reconciled Home Medications:      Medication List      START taking these medications    amoxicillin-clavulanate 875-125mg 875-125 mg per tablet  Commonly known as: AUGMENTIN  Take 1 tablet by mouth every 12 (twelve) hours. for 7 days     doxycycline 100 MG Cap  Commonly known as: VIBRAMYCIN  Take 1 capsule (100 mg total) by mouth 2  (two) times daily. for 7 days     GENVOYA [phone] mg Tab  Generic drug: elviteg-cob-emtri-tenof ALAFEN  Take 1 tablet by mouth once daily.        CONTINUE taking these medications    lisinopriL-hydrochlorothiazide 20-25 mg Tab  Commonly known as: PRINZIDE,ZESTORETIC  Take 1 tablet by mouth once daily.     meclizine 25 mg tablet  Commonly known as: ANTIVERT  Take 1 tablet (25 mg total) by mouth 3 (three) times daily as needed.            Indwelling Lines/Drains at time of discharge:   Lines/Drains/Airways     None                 Time spent on the discharge of patient: 30  minutes         Raquel Smith MD  Department of Hospital Medicine  [facility] ([facility])

## 2022-08-12 LAB — BACTERIA SPEC ANAEROBE CULT: NORMAL

## 2022-08-14 ENCOUNTER — TELEPHONE (OUTPATIENT)
Dept: ADMINISTRATIVE | Facility: CLINIC | Age: 52
End: 2022-08-14
Payer: MEDICARE

## 2022-08-14 NOTE — TELEPHONE ENCOUNTER
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 Patient confirmed her mailing address.    Patient was provided a list of food [place] for her area, a food stamp application, and agencies to assist with utilities/rent at her address listed in epic.     Kiah Sy.

## 2022-08-15 ENCOUNTER — TELEPHONE (OUTPATIENT)
Dept: HEMATOLOGY/ONCOLOGY | Facility: CLINIC | Age: 52
End: 2022-08-15
Payer: MEDICARE

## 2022-08-15 NOTE — TELEPHONE ENCOUNTER
Nurse spoke with pt about r/s appt d/t path not being back yet. Pt and nurse r/s over phone together.

## 2022-08-17 ENCOUNTER — OFFICE VISIT (OUTPATIENT)
Dept: OTOLARYNGOLOGY | Facility: CLINIC | Age: 52
End: 2022-08-17
Payer: MEDICARE

## 2022-08-17 VITALS — TEMPERATURE: 97 F

## 2022-08-17 DIAGNOSIS — R59.0 CERVICAL LYMPHADENOPATHY: ICD-10-CM

## 2022-08-17 DIAGNOSIS — F17.200 SMOKING: Primary | ICD-10-CM

## 2022-08-17 PROCEDURE — 99024 PR POST-OP FOLLOW-UP VISIT: ICD-10-PCS | Mod: S$GLB,,, | Performed by: ORTHOPAEDIC SURGERY

## 2022-08-17 PROCEDURE — 99999 PR PBB SHADOW E&M-EST. PATIENT-LVL III: CPT | Mod: PBBFAC,,, | Performed by: ORTHOPAEDIC SURGERY

## 2022-08-17 PROCEDURE — 4010F ACE/ARB THERAPY RXD/TAKEN: CPT | Mod: CPTII,S$GLB,, | Performed by: ORTHOPAEDIC SURGERY

## 2022-08-17 PROCEDURE — 99024 POSTOP FOLLOW-UP VISIT: CPT | Mod: S$GLB,,, | Performed by: ORTHOPAEDIC SURGERY

## 2022-08-17 PROCEDURE — 99999 PR PBB SHADOW E&M-EST. PATIENT-LVL III: ICD-10-PCS | Mod: PBBFAC,,, | Performed by: ORTHOPAEDIC SURGERY

## 2022-08-17 PROCEDURE — 1159F PR MEDICATION LIST DOCUMENTED IN MEDICAL RECORD: ICD-10-PCS | Mod: CPTII,S$GLB,, | Performed by: ORTHOPAEDIC SURGERY

## 2022-08-17 PROCEDURE — 1159F MED LIST DOCD IN RCRD: CPT | Mod: CPTII,S$GLB,, | Performed by: ORTHOPAEDIC SURGERY

## 2022-08-17 PROCEDURE — 4010F PR ACE/ARB THEARPY RXD/TAKEN: ICD-10-PCS | Mod: CPTII,S$GLB,, | Performed by: ORTHOPAEDIC SURGERY

## 2022-08-17 NOTE — PROGRESS NOTES
Subjective:      Patient ID: Ama Sol is a 52 y.o. female.    Chief Complaint: Post-op Evaluation (Pain where she had biopsy)    Patient is a 52 year old female seen today for evaluation of cervical lymphadenopathy.  She has recently had an excisional biopsy.  Final pathology not yet available, but pathology favoring B-cell lymphoma.  She has appt with heme-onc tomorrow.  She notes continued pain in her neck and the posterior portion of her scalp.  She has sharp pains at the incision site at times as well.  She has felt new nodes in the time following her biopsy.        Review of Systems   Musculoskeletal: Positive for neck pain.       Objective:       Physical Exam  Neck:      Comments: Bulky bilateral cervical lymphadenopathy in all levels, incision site healing well, no erythema or drainage        Assessment:       1. Smoking    2. Cervical lymphadenopathy        Plan:     Smoking  -     Ambulatory referral/consult to Smoking Cessation Program; Future; Expected date: 08/24/2022    Cervical lymphadenopathy    Surgical site healing well.  Keep scheduled appointment tomorrow with heme-onc, final path will be available by that time.

## 2022-08-18 ENCOUNTER — OFFICE VISIT (OUTPATIENT)
Dept: HEMATOLOGY/ONCOLOGY | Facility: CLINIC | Age: 52
End: 2022-08-18
Payer: MEDICARE

## 2022-08-18 ENCOUNTER — TELEPHONE (OUTPATIENT)
Dept: HEMATOLOGY/ONCOLOGY | Facility: CLINIC | Age: 52
End: 2022-08-18
Payer: MEDICARE

## 2022-08-18 ENCOUNTER — TELEPHONE (OUTPATIENT)
Dept: RADIOLOGY | Facility: HOSPITAL | Age: 52
End: 2022-08-18
Payer: MEDICARE

## 2022-08-18 VITALS
HEIGHT: 65 IN | SYSTOLIC BLOOD PRESSURE: 119 MMHG | TEMPERATURE: 98 F | DIASTOLIC BLOOD PRESSURE: 64 MMHG | BODY MASS INDEX: 30.23 KG/M2 | HEART RATE: 116 BPM | WEIGHT: 181.44 LBS | OXYGEN SATURATION: 98 %

## 2022-08-18 DIAGNOSIS — Z13.79 ENCOUNTER FOR OTHER SCREENING FOR GENETIC AND CHROMOSOMAL ANOMALIES: ICD-10-CM

## 2022-08-18 DIAGNOSIS — C83.38 DIFFUSE LARGE B-CELL LYMPHOMA OF LYMPH NODES OF MULTIPLE REGIONS: Primary | ICD-10-CM

## 2022-08-18 PROCEDURE — 3008F BODY MASS INDEX DOCD: CPT | Mod: CPTII,S$GLB,, | Performed by: INTERNAL MEDICINE

## 2022-08-18 PROCEDURE — 99999 PR PBB SHADOW E&M-EST. PATIENT-LVL V: CPT | Mod: PBBFAC,,, | Performed by: INTERNAL MEDICINE

## 2022-08-18 PROCEDURE — 4010F ACE/ARB THERAPY RXD/TAKEN: CPT | Mod: CPTII,S$GLB,, | Performed by: INTERNAL MEDICINE

## 2022-08-18 PROCEDURE — 99999 PR PBB SHADOW E&M-EST. PATIENT-LVL V: ICD-10-PCS | Mod: PBBFAC,,, | Performed by: INTERNAL MEDICINE

## 2022-08-18 PROCEDURE — 1159F PR MEDICATION LIST DOCUMENTED IN MEDICAL RECORD: ICD-10-PCS | Mod: CPTII,S$GLB,, | Performed by: INTERNAL MEDICINE

## 2022-08-18 PROCEDURE — 3008F PR BODY MASS INDEX (BMI) DOCUMENTED: ICD-10-PCS | Mod: CPTII,S$GLB,, | Performed by: INTERNAL MEDICINE

## 2022-08-18 PROCEDURE — 1111F PR DISCHARGE MEDS RECONCILED W/ CURRENT OUTPATIENT MED LIST: ICD-10-PCS | Mod: CPTII,S$GLB,, | Performed by: INTERNAL MEDICINE

## 2022-08-18 PROCEDURE — 3078F PR MOST RECENT DIASTOLIC BLOOD PRESSURE < 80 MM HG: ICD-10-PCS | Mod: CPTII,S$GLB,, | Performed by: INTERNAL MEDICINE

## 2022-08-18 PROCEDURE — 1111F DSCHRG MED/CURRENT MED MERGE: CPT | Mod: CPTII,S$GLB,, | Performed by: INTERNAL MEDICINE

## 2022-08-18 PROCEDURE — 3074F PR MOST RECENT SYSTOLIC BLOOD PRESSURE < 130 MM HG: ICD-10-PCS | Mod: CPTII,S$GLB,, | Performed by: INTERNAL MEDICINE

## 2022-08-18 PROCEDURE — 4010F PR ACE/ARB THEARPY RXD/TAKEN: ICD-10-PCS | Mod: CPTII,S$GLB,, | Performed by: INTERNAL MEDICINE

## 2022-08-18 PROCEDURE — 1159F MED LIST DOCD IN RCRD: CPT | Mod: CPTII,S$GLB,, | Performed by: INTERNAL MEDICINE

## 2022-08-18 PROCEDURE — 3074F SYST BP LT 130 MM HG: CPT | Mod: CPTII,S$GLB,, | Performed by: INTERNAL MEDICINE

## 2022-08-18 PROCEDURE — 99215 OFFICE O/P EST HI 40 MIN: CPT | Mod: S$GLB,,, | Performed by: INTERNAL MEDICINE

## 2022-08-18 PROCEDURE — 3078F DIAST BP <80 MM HG: CPT | Mod: CPTII,S$GLB,, | Performed by: INTERNAL MEDICINE

## 2022-08-18 PROCEDURE — 99215 PR OFFICE/OUTPT VISIT, EST, LEVL V, 40-54 MIN: ICD-10-PCS | Mod: S$GLB,,, | Performed by: INTERNAL MEDICINE

## 2022-08-18 RX ORDER — VALACYCLOVIR HYDROCHLORIDE 500 MG/1
TABLET, FILM COATED ORAL
COMMUNITY

## 2022-08-18 NOTE — TELEPHONE ENCOUNTER
Interventional Radiology:    Spoke with pt to schedule BM Bx & LP for 8/26 at 10:30am & 11:30am.  Denies use of any blood thinners, aspiring or fish oil, aware that they need to arrive for 9:30am, nothing to eat or drink after midnight and will need someone to drive her home following her procedures.

## 2022-08-18 NOTE — TELEPHONE ENCOUNTER
SAEID Sexton explained the below to patient and encouraged her to reach out to her insurance. Virtual visit scheduled under [facility]'s BMT clinic cancelled. Pt verbalized understanding and agreement to call insurance and stated she will also enlist help from a .    Kyrie Duncan LPN w Dr Van updated.            ----- Message from Lion Toro sent at 8/18/2022  2:59 PM CDT -----  Regarding: RE: SCT Benefits Check  Carissa Muniz,    Patient's insurance for medical is out of network for facility.  Patient can only be seen for transplant services with an [employer] case.    Lion  ----- Message -----  From: Cristy Harris RN  Sent: 8/18/2022   2:45 PM CDT  To: Shayna Ferguson, Lion Toro  Subject: SCT Benefits Check                               Hello,    Please complete benefit check for patient. Medical benefit check and transplant benefit check for [facility]on only.    [employer]    Thank you,  Cristy

## 2022-08-19 PROBLEM — C83.38 DIFFUSE LARGE B-CELL LYMPHOMA OF LYMPH NODES OF MULTIPLE REGIONS: Status: ACTIVE | Noted: 2022-08-19

## 2022-08-19 NOTE — PROGRESS NOTES
Subjective:      DATE OF VISIT: 8/18/22     ?  Patient ID:?Ama Sol is a 52 y.o. female.?? MR#: [id number]   ?   REFERRING PROVIDER: No referring provider defined for this encounter.     ? Primary Care Providers:  Meka Andrews NP (General)     CHIEF COMPLAINT: ?  Outpatient follow-up after results of biopsy???   ?   ONCOLOGIC DIAGNOSIS:  Diffuse large B-cell lymphoma, germinal center origin, stage TBD  ?   CURRENT TREATMENT: TBD    PAST TREATMENT:  Biopsy only  ?   ONCOLOGIC HISTORY:   ?   Ms. Ama Sol is a 53yo F patient with a PMH of HTN, HIV, cervical cancer (2015), who presented to the ED 8/1/2022 with severe pain from a mass in her left lateral neck progressive over prior 2 weeks.  Pain radiated to left arm upper back .  No associated weakness numbness tingling upper extremities.  She does endorse occasional night sweats .  No fevers .  ED workup was significant for elevated LDH (483), relatively normal CBC.  CT soft tissue neck demonstrated multifocal lymphadenopathy asymmetric towards the left most focal in the left supraclavicular and lower cervical regions favoring neoplastic disease with infectious etiology or superimposed infection not excluded.  Multiple necrotic lymph nodes with local inflammatory stranding and associated skin thickening and soft tissue fat stranding are noted in the supraclavicular region.     Hematology/Oncology was consulted for evaluation of neck mass due to concern for neoplastic process inpatient.  ENT performed excisional biopsy.  She follows up today outpatient after results of this biopsy.      She is been seen by palliative care to assist in pain management with some improvement but continues have pain in this region.  Continues to have known logic symptoms.  Since discharge over last couple weeks she notes new lymphadenopathy right cervical supraclavicular regions.         Review of Systems    ?   A comprehensive 14-point review of  systems was reviewed with patient and was negative other than as specified above.   ?   PAST MEDICAL HISTORY:   No past medical history on file. ?     PAST SURGICAL HISTORY:   Past Surgical History:   Procedure Laterality Date    BIOPSY OF CERVICAL LYMPH NODE Left 8/4/2022    Procedure: BIOPSY, LYMPH NODE, CERVICAL;  Surgeon: Keturah Garcia MD;  Location: [facility];  Service: ENT;  Laterality: Left;      ?   ALLERGIES:   Allergies as of 08/18/2022    (No Known Allergies)      ?   MEDICATIONS:?   Outpatient Medications Marked as Taking for the 8/18/22 encounter (Office Visit) with Maria Del Rosario Van MD   Medication Sig Dispense Refill    elviteg-cob-emtri-tenof ALAFEN (GENVOYA) [id number] mg Tab Take 1 tablet by mouth once daily.      lisinopriL-hydrochlorothiazide (PRINZIDE,ZESTORETIC) 20-25 mg Tab Take 1 tablet by mouth once daily.        ?   SOCIAL HISTORY:?   Social History     Tobacco Use    Smoking status: Current Every Day Smoker     Packs/day: 0.50     Types: Cigarettes    Smokeless tobacco: Never Used    Tobacco comment: put in referral   Substance Use Topics    Alcohol use: Not Currently      ?      ?   FAMILY HISTORY:   family history is not on file.   ?        Objective:      Physical Exam      ?   Vitals:    08/18/22 0801   BP: 119/64   Pulse: (!) 116   Temp: 98.1 °F (36.7 °C)      ?   ECOG:?0   General appearance: Generally well appearing, in no acute distress.   Head, eyes, ears, nose, and throat: Pupils round and equally reactive to light. Oropharynx clear with moist mucous membranes.  Bilateral cervical, supraclavicular lymphadenopathy  Respiratory:  Normal work of breathing  Abdomen: nondistended.   Extremities: Warm, without edema.   Neurologic: Alert and oriented. Grossly normal strength, coordination, and gait.   Skin: No rashes, ecchymoses or petechial lesion.     ?   Laboratory:  ?   No visits with results within 1 Day(s) from this visit.   Latest known visit with results is:    Admission on 08/03/2022, Discharged on 08/05/2022   Component Date Value Ref Range Status    WBC 08/03/2022 6.24  3.90 - 12.70 K/uL Final    RBC 08/03/2022 3.65 (A) 4.00 - 5.40 M/uL Final    Hemoglobin 08/03/2022 11.6 (A) 12.0 - 16.0 g/dL Final    Hematocrit 08/03/2022 33.9 (A) 37.0 - 48.5 % Final    MCV 08/03/2022 93  82 - 98 fL Final    MCH 08/03/2022 31.8 (A) 27.0 - 31.0 pg Final    MCHC 08/03/2022 34.2  32.0 - 36.0 g/dL Final    RDW 08/03/2022 13.2  11.5 - 14.5 % Final    Platelets 08/03/2022 234  150 - 450 K/uL Final    MPV 08/03/2022 9.4  9.2 - 12.9 fL Final    Immature Granulocytes 08/03/2022 0.3  0.0 - 0.5 % Final    Gran # (ANC) 08/03/2022 2.8  1.8 - 7.7 K/uL Final    Immature Grans (Abs) 08/03/2022 0.02  0.00 - 0.04 K/uL Final    Lymph # 08/03/2022 2.9  1.0 - 4.8 K/uL Final    Mono # 08/03/2022 0.5  0.3 - 1.0 K/uL Final    Eos # 08/03/2022 0.1  0.0 - 0.5 K/uL Final    Baso # 08/03/2022 0.01  0.00 - 0.20 K/uL Final    nRBC 08/03/2022 0  0 /100 WBC Final    Gran % 08/03/2022 44.9  38.0 - 73.0 % Final    Lymph % 08/03/2022 45.8  18.0 - 48.0 % Final    Mono % 08/03/2022 7.2  4.0 - 15.0 % Final    Eosinophil % 08/03/2022 1.6  0.0 - 8.0 % Final    Basophil % 08/03/2022 0.2  0.0 - 1.9 % Final    Platelet Estimate 08/03/2022 Appears normal   Final    Aniso 08/03/2022 Slight   Final    Ovalocytes 08/03/2022 Occasional   Final    Differential Method 08/03/2022 Automated   Final    Sodium 08/03/2022 137  136 - 145 mmol/L Final    Potassium 08/03/2022 4.2  3.5 - 5.1 mmol/L Final    Chloride 08/03/2022 104  95 - 110 mmol/L Final    CO2 08/03/2022 24  23 - 29 mmol/L Final    Glucose 08/03/2022 111 (A) 70 - 110 mg/dL Final    BUN 08/03/2022 11  6 - 20 mg/dL Final    Creatinine 08/03/2022 0.8  0.5 - 1.4 mg/dL Final    Calcium 08/03/2022 9.9  8.7 - 10.5 mg/dL Final    Total Protein 08/03/2022 8.2  6.0 - 8.4 g/dL Final    Albumin 08/03/2022 3.5  3.5 - 5.2 g/dL Final    Total Bilirubin  08/03/2022 0.3  0.1 - 1.0 mg/dL Final    Alkaline Phosphatase 08/03/2022 86  55 - 135 U/L Final    AST 08/03/2022 37  10 - 40 U/L Final    ALT 08/03/2022 32  10 - 44 U/L Final    Anion Gap 08/03/2022 9  8 - 16 mmol/L Final    eGFR 08/03/2022 >60  >60 mL/min/1.73 m^2 Final    TSH 08/03/2022 0.574  0.400 - 4.000 uIU/mL Final    T3, Total 08/03/2022 156  60 - 180 ng/dL Final    Free T4 08/03/2022 1.08  0.71 - 1.51 ng/dL Final    LD 08/03/2022 483 (A) 110 - 260 U/L Final    SARS-CoV-2 RNA, Amplification, Qual 08/03/2022 Negative  Negative Final    Blood Culture, Routine 08/03/2022 No growth after 5 days.   Final    Blood Culture, Routine 08/03/2022 No growth after 5 days.   Final    CD4 % [place] T Cell 08/03/2022 26.9 (A) 28 - 57 % Final    Absolute CD4 08/03/2022 661  300 - 1400 cells/ul Final    HIV-1 RNA, Quantitative 08/03/2022 264 (A) Undetected copies/mL Final    WBC 08/04/2022 5.10  3.90 - 12.70 K/uL Final    RBC 08/04/2022 3.29 (A) 4.00 - 5.40 M/uL Final    Hemoglobin 08/04/2022 10.3 (A) 12.0 - 16.0 g/dL Final    Hematocrit 08/04/2022 30.8 (A) 37.0 - 48.5 % Final    MCV 08/04/2022 94  82 - 98 fL Final    MCH 08/04/2022 31.3 (A) 27.0 - 31.0 pg Final    MCHC 08/04/2022 33.4  32.0 - 36.0 g/dL Final    RDW 08/04/2022 13.3  11.5 - 14.5 % Final    Platelets 08/04/2022 208  150 - 450 K/uL Final    MPV 08/04/2022 9.3  9.2 - 12.9 fL Final    Immature Granulocytes 08/04/2022 0.2  0.0 - 0.5 % Final    Gran # (ANC) 08/04/2022 2.5  1.8 - 7.7 K/uL Final    Immature Grans (Abs) 08/04/2022 0.01  0.00 - 0.04 K/uL Final    Lymph # 08/04/2022 2.1  1.0 - 4.8 K/uL Final    Mono # 08/04/2022 0.4  0.3 - 1.0 K/uL Final    Eos # 08/04/2022 0.1  0.0 - 0.5 K/uL Final    Baso # 08/04/2022 0.02  0.00 - 0.20 K/uL Final    nRBC 08/04/2022 0  0 /100 WBC Final    Gran % 08/04/2022 48.4  38.0 - 73.0 % Final    Lymph % 08/04/2022 40.8  18.0 - 48.0 % Final    Mono % 08/04/2022 7.8  4.0 - 15.0 % Final     Eosinophil % 08/04/2022 2.4  0.0 - 8.0 % Final    Basophil % 08/04/2022 0.4  0.0 - 1.9 % Final    Differential Method 08/04/2022 Automated   Final    Sodium 08/04/2022 138  136 - 145 mmol/L Final    Potassium 08/04/2022 4.5  3.5 - 5.1 mmol/L Final    Chloride 08/04/2022 107  95 - 110 mmol/L Final    CO2 08/04/2022 22 (A) 23 - 29 mmol/L Final    Glucose 08/04/2022 95  70 - 110 mg/dL Final    BUN 08/04/2022 12  6 - 20 mg/dL Final    Creatinine 08/04/2022 0.7  0.5 - 1.4 mg/dL Final    Calcium 08/04/2022 8.5 (A) 8.7 - 10.5 mg/dL Final    Total Protein 08/04/2022 6.6  6.0 - 8.4 g/dL Final    Albumin 08/04/2022 2.8 (A) 3.5 - 5.2 g/dL Final    Total Bilirubin 08/04/2022 0.3  0.1 - 1.0 mg/dL Final    Alkaline Phosphatase 08/04/2022 77  55 - 135 U/L Final    AST 08/04/2022 27  10 - 40 U/L Final    ALT 08/04/2022 21  10 - 44 U/L Final    Anion Gap 08/04/2022 9  8 - 16 mmol/L Final    eGFR 08/04/2022 >60  >60 mL/min/1.73 m^2 Final    Procalcitonin 08/04/2022 0.04  <0.25 ng/mL Final    NIL 08/04/2022 0.75615  IU/mL Final    TB1 - Nil 08/04/2022 0.018  IU/mL Final    TB2 - Nil 08/04/2022 0.004  IU/mL Final    Mitogen - Nil 08/04/2022 3.075  IU/mL Final    TB Gold Plus 08/04/2022 Negative  Negative Final    Anaerobic Culture 08/04/2022 No anaerobes isolated   Final    Aerobic Bacterial Culture 08/04/2022 No growth   Final    Fungus (Mycology) Culture 08/04/2022 Culture in progress   Preliminary    Final Pathologic Diagnosis 08/04/2022    Final                    Value:1.2, LEFT NECK MASS, BIOPSIES:  -- B-CELL LYMPHOMA WITH EXTENSIVE NECROSIS, FAVORING DIFFUSE LARGE B-CELL  LYMPHOMA, GERMINAL CENTER B-CELL PHENOTYPE (SEE COMMENT).      Comment 08/04/2022    Final                    Value:Flow cytometric analysis was not performed.  Immunohistochemical stains are performed with adequate controls.  CD21 and  CD23 fail to detect follicular dendritic cell meshworks.  The atypical  lymphoid cells are  "positive for CD20 and CD10 (very weak, >30%); negative for  BCL 6, BCL 2, MUM1, cyclin D1, and CD30.  They display increased  proliferation index (Ki-67 50%).  CD3-positive small T lymphocytes are seen.  Rare -positive plasma cells are polytypic by immunoglobulins kappa and  lambda light chain in sagittal hybridization.  CMV and EBV (KEYLA in-situ  hybridization) are negative.  Special stains are performed with adequate controls.  GMS and AFB fail to  detect microorganisms.  Although the specimen is suboptimal for morphologic evaluation due to  extensive necrosis, the overall findings are consistent with B-cell lymphoma.   Diffuse large B-cell lymphoma is favored.  Fluorescence in-situ  hybridization for myc/BCL-6/BCL 2 rearrangement is pending.  A supplementary  report madiha                          l follow.      Frozen Section Diagnosis 08/04/2022 Negative for Malignancy   Final    Microscopic Exam 08/04/2022    Final                    Value:1,2. Histologic sections of the specimen shows extensive necrosis.  In  relatively viable areas, diffuse infiltrate of atypical lymphoid cells are  seen.  The cells are medium-sized to large and display irregular nuclear  contour and vesicular nucleus.  Scattered small lymphoid cells are seen in  the background.  Other abnormal infiltrates are not identified.      Gross 08/04/2022    Final                    Value:1:  Surgery ID:  [id number]    Pathology ID:  [id number]  1. Received fresh labeled "left neck mass" is a 2.2 x 1.5 x 0.5 cm tan-brown,  rubbery portion of tissue.  The specimen is submitted entirely for frozen  section with the frozen section remnant submitted in cassette 1A.  Grossed by: Kwabena Valencia   2: Surgery ID:  [id number]   Pathology ID:  [id number]  2. Received in formalin labeled "left neck mass" are multiple tan-brown,  nodular portions of tissue aggregating to 2.5 x 1.5 x 0.5 cm.  The specimen  is submitted entirely in cassette 2A.  Grossed by: Kwabena Valencia   "    Frozen Section Footnote 08/04/2022    Final                    Value:Frozen section performed at [facility], [id number]  [place], [address]      Disclaimer 08/04/2022    Final                    Value:Unless the case is a 'gross only' or additional testing only, the final  diagnosis for each specimen is based on a microscopic examination of  appropriate tissue sections.  This test was developed and its performance characteristics determined by  [facility], Department of Pathology and Laboratory Medicine. It  has not been cleared or approved by the US Food and Drug Administration. The  FDA has determined that such clearance or approval is not necessary. This  test is used for clinical purposes. It should not be regarded as  investigational or for research. This laboratory is certified under the  Clinical Laboratory Improvement Admendments (CLIA) as qualified to perform  such high complexity clinical laboratory testing  CD20 (L26) immunohistochemical staining (close L26, DAB detection method) is  performed on formalin-fixed (10% neutral buffered formalin), paraffin  embedded tissues sections. The presence of an appropriately colored reaction  product within the target cell                          s is indicative of positive reactivity.  Positive staining intensity should be assessed within the context of any  background staining of the negative reagent control. This test was developed  and performance characteristics determined by [facility], Section  of Anatomic Pathology. It has been cleared by the U.S. Food and Drug  Administration.      WBC 08/05/2022 5.73  3.90 - 12.70 K/uL Final    RBC 08/05/2022 3.30 (A) 4.00 - 5.40 M/uL Final    Hemoglobin 08/05/2022 10.3 (A) 12.0 - 16.0 g/dL Final    Hematocrit 08/05/2022 31.4 (A) 37.0 - 48.5 % Final    MCV 08/05/2022 95  82 - 98 fL Final    MCH 08/05/2022 31.2 (A) 27.0 - 31.0 pg Final    MCHC 08/05/2022  32.8  32.0 - 36.0 g/dL Final    RDW 08/05/2022 13.0  11.5 - 14.5 % Final    Platelets 08/05/2022 207  150 - 450 K/uL Final    MPV 08/05/2022 9.7  9.2 - 12.9 fL Final    Immature Granulocytes 08/05/2022 0.3  0.0 - 0.5 % Final    Gran # (ANC) 08/05/2022 3.0  1.8 - 7.7 K/uL Final    Immature Grans (Abs) 08/05/2022 0.02  0.00 - 0.04 K/uL Final    Lymph # 08/05/2022 2.1  1.0 - 4.8 K/uL Final    Mono # 08/05/2022 0.5  0.3 - 1.0 K/uL Final    Eos # 08/05/2022 0.1  0.0 - 0.5 K/uL Final    Baso # 08/05/2022 0.02  0.00 - 0.20 K/uL Final    nRBC 08/05/2022 0  0 /100 WBC Final    Gran % 08/05/2022 52.7  38.0 - 73.0 % Final    Lymph % 08/05/2022 36.6  18.0 - 48.0 % Final    Mono % 08/05/2022 8.4  4.0 - 15.0 % Final    Eosinophil % 08/05/2022 1.7  0.0 - 8.0 % Final    Basophil % 08/05/2022 0.3  0.0 - 1.9 % Final    Differential Method 08/05/2022 Automated   Final    Sodium 08/05/2022 137  136 - 145 mmol/L Final    Potassium 08/05/2022 4.2  3.5 - 5.1 mmol/L Final    Chloride 08/05/2022 105  95 - 110 mmol/L Final    CO2 08/05/2022 23  23 - 29 mmol/L Final    Glucose 08/05/2022 97  70 - 110 mg/dL Final    BUN 08/05/2022 11  6 - 20 mg/dL Final    Creatinine 08/05/2022 0.8  0.5 - 1.4 mg/dL Final    Calcium 08/05/2022 8.6 (A) 8.7 - 10.5 mg/dL Final    Total Protein 08/05/2022 6.7  6.0 - 8.4 g/dL Final    Albumin 08/05/2022 2.7 (A) 3.5 - 5.2 g/dL Final    Total Bilirubin 08/05/2022 0.3  0.1 - 1.0 mg/dL Final    Alkaline Phosphatase 08/05/2022 77  55 - 135 U/L Final    AST 08/05/2022 29  10 - 40 U/L Final    ALT 08/05/2022 24  10 - 44 U/L Final    Anion Gap 08/05/2022 9  8 - 16 mmol/L Final    eGFR 08/05/2022 >60  >60 mL/min/1.73 m^2 Final    Magnesium 08/05/2022 2.0  1.6 - 2.6 mg/dL Final    Phosphorus 08/05/2022 2.7  2.7 - 4.5 mg/dL Final    AFB Culture & Smear 08/04/2022 Culture in progress   Preliminary    AFB CULTURE STAIN 08/04/2022 No acid fast bacilli seen.   Final      ?   Tumor markers   ?    ?   Imaging:  ?    Results for orders placed or performed during the hospital encounter of 08/03/22 (from the past 2160 hour(s))   CT Soft Tissue Neck With Contrast    Impression    Multifocal lymphadenopathy asymmetric towards the left most focal in the left supraclavicular and lower cervical regions favoring neoplastic disease with infectious etiology or superimposed infection not excluded.  Multiple necrotic lymph nodes with local inflammatory stranding and associated skin thickening and soft tissue fat stranding are noted in the supraclavicular region.    This report was flagged in Epic as abnormal.    The preliminary and final reports are concordant.      Electronically signed by: Abraham Correia  Date:    08/03/2022  Time:    08:10     No results found for this or any previous visit (from the past 2160 hour(s)).  No results found for this or any previous visit (from the past 2160 hour(s)).      Pathology:    See above    ?   Assessment/Plan:   Diffuse large B-cell lymphoma of lymph nodes of multiple regions  -     NM PET CT Routine FDG; Future; Expected date: 08/18/2022  -     MRI Brain W WO Contrast; Future; Expected date: 08/18/2022  -     Gram stain  -     AFB Culture & Smear  -     CSF culture  -     Protein, CSF  -     Glucose, CSF  -     CSF cell count with differential  -     Cytology, Fluid/Wash/Brush  -     IR Lumbar Puncture Diagnostic; Future; Expected date: 08/18/2022  -     Chromosome Analysis, Bone Marrow; Future; Expected date: 08/18/2022  -     CT Biopsy Bone Marrow (xpd); Future; Expected date: 08/18/2022  -     Leukemia/Lymphoma Screen - Bone Marrow Right Posterior Iliac Crest; Future; Expected date: 08/18/2022    Encounter for other screening for genetic and chromosomal anomalies   -     Chromosome Analysis, Bone Marrow; Future; Expected date: 08/18/2022       1. Diffuse large B-cell lymphoma of lymph nodes of multiple regions    2. Encounter for other screening for genetic and chromosomal  anomalies           Plan:     Problem List Items Addressed This Visit    None     Visit Diagnoses     Diffuse large B-cell lymphoma of lymph nodes of multiple regions    -  Primary    Encounter for other screening for genetic and chromosomal anomalies               Pathology positive for high-grade B-cell lymphoma most consistent with diffuse large B-cell lymphoma of germinal center origin, myc/BCL2/6 rearrangement hoang studies pending.  I reviewed with her diagnosis natural history and need for additional testing for staging.  High risk given HIV associated.  Recommend PET scan, MRI brain and lumbar puncture with bone marrow biopsy for complete staging and referral to colleagues in BMT for second opinion.  Discussed potential implication of her HIV and importance of optimal control follow-up with ID a need for expedited management given aggressive diagnosis.  She expressed understanding and agree with this plan.    Follow-Up:   Patient Instructions   PET  Bmbx and LP with IR asap  MRI brain asap  Dr. Jet taylor when next in [place]  RV after above

## 2022-08-22 ENCOUNTER — TELEPHONE (OUTPATIENT)
Dept: HEMATOLOGY/ONCOLOGY | Facility: CLINIC | Age: 52
End: 2022-08-22
Payer: MEDICARE

## 2022-08-22 NOTE — TELEPHONE ENCOUNTER
"Pt called and states that she understands that she has to get care elsewhere but in the meantime she c/o her "Lymph nodes are moving". "Coming up on the side of ear where they were originally in middle of neck." doesn't know if its the cancer but states it is getting more painful. Nurse will route to Md for advice  "

## 2022-08-22 NOTE — TELEPHONE ENCOUNTER
10:05 AM Swer was consulted to assist with referring pt. out as pt.'s insurance does not [facility]. Swer attempted to reach pt. at ([phone]) however, swer received no telephone pickup. Swer was able to leave a  for a call back. Swer will continue to f/u.     1:07PM Swer received return call from pt. Pt. wishes to be referred to [facility]. Pt. reports she is at work and can return calls if she is unable to answer.  Swer called [facility] ([phone]). [facility] provided fax ([phone]). Swer faxed pt. documents to [facility] nurse . Swer called pt. to update on referral. Pt. inquired if swer could reach out to  Madeline who began to work on referral for pt. Swer called [facility] to reach Madeline, however office does not know of anyone by this name. Office to return swer call. Swer will remain available.     2:47PM Cherri received call from Jane with Dr. Strong's office with [facility]. Jane provided cherri with another fax number ([phone]) for cherri to fax over documents. Jane has spoken to pt. Cherri faxed documents to number on today's date. Swer will remain available.

## 2022-08-22 NOTE — TELEPHONE ENCOUNTER
Nurse called pt back to let her know dr. marcos recommendations of and getting an alternative med oncologist asap d/t insurance issues. Pt verbalized understanding

## 2022-08-22 NOTE — PHYSICIAN QUERY
PT Name: Ama Sol  MR #: [id number]    DOCUMENTATION CLARIFICATION     CDS/: Lynnette Damon RN BSN             Contact information:[email or web address]  This form is a permanent document in the medical record.     Query Date: August 22, 2022    By submitting this query, we are merely seeking further clarification of documentation.  Please utilize your independent clinical judgment when addressing the question(s) below.    The medical record contains the following:  Pathology Findings Location in Medical Record   8/22 Pathology final result  1.2, LEFT NECK MASS, BIOPSIES:   -- B-CELL LYMPHOMA WITH EXTENSIVE NECROSIS, FAVORING DIFFUSE LARGE B-CELL   LYMPHOMA, GERMINAL CENTER B-CELL PHENOTYPE (SEE COMMENT). 8/22/22 Pathology final result       Please clarify:  [x  ] Pathology findings noted above are ruled in/confirmed as diagnoses   [  ] Pathology findings noted above are not confirmed as diagnoses   [  ] Other diagnosis (please specify): ___________   [  ] Clinically Undetermined       Please document in your progress notes daily for the duration of treatment until resolved and include in your discharge summary.    Form No. [id number]

## 2022-08-22 NOTE — TELEPHONE ENCOUNTER
----- Message from Gabriela Reyes sent at 8/22/2022  4:22 PM CDT -----  Contact: Ama  Type:  Needs Medical Advice    Who Called: Ama  Symptoms (please be specific): Patient reports feeling movement within neck area  How long has patient had these symptoms: Since onset  Pharmacy name and phone #:    [id number] - [place], [place] - [address] AT [facility]  [address]  [facility] [address]  Phone: [phone] Fax: [phone]  Would the patient rather a call back or a response via [facility]? call  Best Call Back Number: [phone]   Additional Information: Please give patient an immediate call back.  Thank you,  GH

## 2022-08-23 ENCOUNTER — TELEPHONE (OUTPATIENT)
Dept: HEMATOLOGY/ONCOLOGY | Facility: CLINIC | Age: 52
End: 2022-08-23
Payer: MEDICARE

## 2022-08-23 ENCOUNTER — PATIENT MESSAGE (OUTPATIENT)
Dept: HEMATOLOGY/ONCOLOGY | Facility: CLINIC | Age: 52
End: 2022-08-23
Payer: MEDICARE

## 2022-08-23 NOTE — TELEPHONE ENCOUNTER
Swer was consulted to assist with pt. questions regarding referral. Swer called pt. Pt. reported [facility] had not yet reached out to pt. Swer called [facility] to inquire about referral. Cassie with [facility] reported referral will take 1-2 business days. Swer stressed the need for urgency/expedite. Swer will remain available.

## 2022-08-25 ENCOUNTER — DOCUMENTATION ONLY (OUTPATIENT)
Dept: HEMATOLOGY/ONCOLOGY | Facility: CLINIC | Age: 52
End: 2022-08-25
Payer: MEDICARE

## 2022-08-25 NOTE — PROGRESS NOTES
Cherri called [facility] on today's date to check-in on referral. It was reported pt. has an appointment at 2:30pm with a provider through [facility]. Cherri updated staff. Cherri will remain available.

## 2022-08-29 LAB
COMMENT: NORMAL
FINAL PATHOLOGIC DIAGNOSIS: NORMAL
FROZEN SECTION DIAGNOSIS: NORMAL
GROSS: NORMAL
Lab: NORMAL
MICROSCOPIC EXAM: NORMAL
SUPPLEMENTAL DIAGNOSIS: NORMAL

## 2022-09-07 LAB — FUNGUS SPEC CULT: NORMAL

## 2022-09-23 LAB
ACID FAST MOD KINY STN SPEC: NORMAL
MYCOBACTERIUM SPEC QL CULT: NORMAL

## 2024-08-06 DIAGNOSIS — R93.5 ABNORMAL US (ULTRASOUND) OF ABDOMEN: ICD-10-CM

## 2024-08-06 DIAGNOSIS — B18.1 CHRONIC VIRAL HEPATITIS B WITHOUT DELTA AGENT AND WITHOUT COMA: ICD-10-CM

## 2024-08-06 DIAGNOSIS — R79.89 ABNORMAL LFTS: Primary | ICD-10-CM

## 2024-08-14 ENCOUNTER — TELEPHONE (OUTPATIENT)
Dept: HEPATOLOGY | Facility: CLINIC | Age: 54
End: 2024-08-14
Payer: MEDICARE

## 2024-08-14 NOTE — TELEPHONE ENCOUNTER
Attempted to contact patient at number listed and is no longer in service.     Spoke with family member Selma Wu who states that she will have patient return our call for scheduling. She did not provide an update phone number for patient.     Provided my contact information for scheduling.

## (undated) DEVICE — MANIFOLD 4 PORT

## (undated) DEVICE — COVER OVERHEAD SURG LT BLUE

## (undated) DEVICE — TOWEL OR DISP STRL BLUE 4/PK

## (undated) DEVICE — SOL NS 1000CC

## (undated) DEVICE — COVER MAYO STAND 23X54IN

## (undated) DEVICE — BOWL STERILE LARGE 32OZ

## (undated) DEVICE — GLOVE SURGICAL LATEX SZ 6

## (undated) DEVICE — SYR IRRIGATION BULB STER 60ML

## (undated) DEVICE — SUT VICRYL 3-0 27 SH

## (undated) DEVICE — ELECTRODE BLADE INSULATED 1 IN

## (undated) DEVICE — TUBING MEDI-VAC 20FT .25IN

## (undated) DEVICE — DRESSING TELFA N ADH 3X8

## (undated) DEVICE — SHEET THYROID W/ISO-BAC

## (undated) DEVICE — PACK BASIC SETUP SC BR

## (undated) DEVICE — CORD BIPOLAR 12 FOOT

## (undated) DEVICE — SUT MONOCRYL 4.0 PS2 CP496G

## (undated) DEVICE — DRESSING TRANS 2X2 TEGADERM